# Patient Record
Sex: MALE | Race: WHITE | Employment: FULL TIME | URBAN - METROPOLITAN AREA
[De-identification: names, ages, dates, MRNs, and addresses within clinical notes are randomized per-mention and may not be internally consistent; named-entity substitution may affect disease eponyms.]

---

## 2017-03-20 ENCOUNTER — OFFICE VISIT (OUTPATIENT)
Dept: FAMILY MEDICINE CLINIC | Age: 52
End: 2017-03-20

## 2017-03-20 VITALS
DIASTOLIC BLOOD PRESSURE: 89 MMHG | HEART RATE: 85 BPM | RESPIRATION RATE: 14 BRPM | WEIGHT: 295.6 LBS | BODY MASS INDEX: 42.32 KG/M2 | TEMPERATURE: 98.2 F | SYSTOLIC BLOOD PRESSURE: 133 MMHG | HEIGHT: 70 IN

## 2017-03-20 DIAGNOSIS — E11.9 TYPE 2 DIABETES MELLITUS WITHOUT COMPLICATION, WITHOUT LONG-TERM CURRENT USE OF INSULIN (HCC): Primary | ICD-10-CM

## 2017-03-20 DIAGNOSIS — E55.9 VITAMIN D DEFICIENCY: ICD-10-CM

## 2017-03-20 DIAGNOSIS — E11.9 NEW ONSET TYPE 2 DIABETES MELLITUS (HCC): Primary | ICD-10-CM

## 2017-03-20 DIAGNOSIS — G47.33 OBSTRUCTIVE SLEEP APNEA: ICD-10-CM

## 2017-03-20 DIAGNOSIS — I69.90 LATE EFFECTS OF CVA (CEREBROVASCULAR ACCIDENT): ICD-10-CM

## 2017-03-20 DIAGNOSIS — E66.3 OVERWEIGHT: ICD-10-CM

## 2017-03-20 LAB
ANION GAP SERPL CALCULATED.3IONS-SCNC: 15 MEQ/L (ref 8–16)
BASOPHILS # BLD: 0.2 %
BASOPHILS ABSOLUTE: 0 THOU/MM3 (ref 0–0.1)
BUN BLDV-MCNC: 14 MG/DL (ref 7–22)
CALCIUM SERPL-MCNC: 8.7 MG/DL (ref 8.5–10.5)
CHLORIDE BLD-SCNC: 103 MEQ/L (ref 98–111)
CO2: 24 MEQ/L (ref 23–33)
CREAT SERPL-MCNC: 0.8 MG/DL (ref 0.4–1.2)
CREATININE, URINE: 287.4 MG/DL
EOSINOPHIL # BLD: 1 %
EOSINOPHILS ABSOLUTE: 0.1 THOU/MM3 (ref 0–0.4)
GFR SERPL CREATININE-BSD FRML MDRD: > 90 ML/MIN/1.73M2
GLUCOSE BLD-MCNC: 128 MG/DL (ref 70–108)
HBA1C MFR BLD: 6.6 %
HCT VFR BLD CALC: 43.8 % (ref 42–52)
HEMOGLOBIN: 14.4 GM/DL (ref 14–18)
LYMPHOCYTES # BLD: 22.7 %
LYMPHOCYTES ABSOLUTE: 2.5 THOU/MM3 (ref 1–4.8)
MAGNESIUM: 2.2 MG/DL (ref 1.6–2.4)
MCH RBC QN AUTO: 29.5 PG (ref 27–31)
MCHC RBC AUTO-ENTMCNC: 32.8 GM/DL (ref 33–37)
MCV RBC AUTO: 90.1 FL (ref 80–94)
MICROALBUMIN UR-MCNC: 1.75 MG/DL
MICROALBUMIN/CREAT UR-RTO: 6 MG/G (ref 0–30)
MONOCYTES # BLD: 5.7 %
MONOCYTES ABSOLUTE: 0.6 THOU/MM3 (ref 0.4–1.3)
NUCLEATED RED BLOOD CELLS: 0 /100 WBC
PDW BLD-RTO: 13.2 % (ref 11.5–14.5)
PLATELET # BLD: 317 THOU/MM3 (ref 130–400)
PMV BLD AUTO: 8.9 MCM (ref 7.4–10.4)
POTASSIUM SERPL-SCNC: 4.1 MEQ/L (ref 3.5–5.2)
PTH INTACT: 66.8 PG/ML (ref 15–65)
RBC # BLD: 4.86 MILL/MM3 (ref 4.7–6.1)
RBC # BLD: NORMAL 10*6/UL
SEDIMENTATION RATE, ERYTHROCYTE: 18 MM/HR (ref 0–10)
SEG NEUTROPHILS: 70.4 %
SEGMENTED NEUTROPHILS ABSOLUTE COUNT: 7.7 THOU/MM3 (ref 1.8–7.7)
SODIUM BLD-SCNC: 142 MEQ/L (ref 135–145)
THYROXINE (T4): 6.1 UG/DL (ref 4.5–12)
TSH SERPL DL<=0.05 MIU/L-ACNC: 3.5 MCIU/ML (ref 0.4–4.2)
VITAMIN D 25-HYDROXY: 30 NG/ML (ref 30–100)
WBC # BLD: 11 THOU/MM3 (ref 4.8–10.8)

## 2017-03-20 PROCEDURE — 99214 OFFICE O/P EST MOD 30 MIN: CPT | Performed by: FAMILY MEDICINE

## 2017-03-20 PROCEDURE — 36415 COLL VENOUS BLD VENIPUNCTURE: CPT | Performed by: FAMILY MEDICINE

## 2017-03-20 PROCEDURE — 83036 HEMOGLOBIN GLYCOSYLATED A1C: CPT | Performed by: FAMILY MEDICINE

## 2017-03-20 RX ORDER — ERGOCALCIFEROL (VITAMIN D2) 1250 MCG
50000 CAPSULE ORAL WEEKLY
Qty: 4 CAPSULE | Refills: 5 | Status: SHIPPED | OUTPATIENT
Start: 2017-03-20 | End: 2017-12-27 | Stop reason: DRUGHIGH

## 2017-03-20 RX ORDER — AMPICILLIN TRIHYDRATE 250 MG
1 CAPSULE ORAL
Qty: 90 CAPSULE | Refills: 5 | Status: SHIPPED | OUTPATIENT
Start: 2017-03-20

## 2017-03-20 RX ORDER — M-VIT,TX,IRON,MINS/CALC/FOLIC 27MG-0.4MG
1 TABLET ORAL DAILY
COMMUNITY

## 2017-03-20 RX ORDER — BLOOD-GLUCOSE METER
1 KIT MISCELLANEOUS DAILY
Qty: 1 KIT | Refills: 0 | Status: SHIPPED | OUTPATIENT
Start: 2017-03-20 | End: 2018-12-17 | Stop reason: CLARIF

## 2017-03-20 RX ORDER — SERTRALINE HYDROCHLORIDE 100 MG/1
100 TABLET, FILM COATED ORAL DAILY
Qty: 30 TABLET | Refills: 5 | Status: SHIPPED | OUTPATIENT
Start: 2017-03-20 | End: 2017-06-20 | Stop reason: SDUPTHER

## 2017-03-20 RX ORDER — ATORVASTATIN CALCIUM 20 MG/1
20 TABLET, FILM COATED ORAL DAILY
Qty: 30 TABLET | Refills: 5 | Status: SHIPPED | OUTPATIENT
Start: 2017-03-20 | End: 2017-06-20 | Stop reason: SDUPTHER

## 2017-03-20 ASSESSMENT — ENCOUNTER SYMPTOMS
ABDOMINAL PAIN: 0
SHORTNESS OF BREATH: 0
DIARRHEA: 0
BLOOD IN STOOL: 0
VOMITING: 0
CONSTIPATION: 0
NAUSEA: 0
EYE PAIN: 0
COUGH: 0
TROUBLE SWALLOWING: 0

## 2017-03-21 ENCOUNTER — TELEPHONE (OUTPATIENT)
Dept: FAMILY MEDICINE CLINIC | Age: 52
End: 2017-03-21

## 2017-03-21 DIAGNOSIS — E55.9 VITAMIN D DEFICIENCY: Primary | ICD-10-CM

## 2017-03-21 DIAGNOSIS — E11.9 NEW ONSET TYPE 2 DIABETES MELLITUS (HCC): ICD-10-CM

## 2017-03-21 RX ORDER — LANCETS
EACH MISCELLANEOUS
Qty: 100 EACH | Refills: 3 | Status: SHIPPED | OUTPATIENT
Start: 2017-03-21 | End: 2017-06-20 | Stop reason: SDUPTHER

## 2017-03-24 ENCOUNTER — NURSE ONLY (OUTPATIENT)
Dept: FAMILY MEDICINE CLINIC | Age: 52
End: 2017-03-24

## 2017-03-24 DIAGNOSIS — Z71.89 DIABETES EDUCATION, ENCOUNTER FOR: Primary | ICD-10-CM

## 2017-04-21 ENCOUNTER — OFFICE VISIT (OUTPATIENT)
Dept: FAMILY MEDICINE CLINIC | Age: 52
End: 2017-04-21

## 2017-04-21 VITALS
RESPIRATION RATE: 10 BRPM | HEIGHT: 70 IN | SYSTOLIC BLOOD PRESSURE: 134 MMHG | HEART RATE: 65 BPM | BODY MASS INDEX: 39.4 KG/M2 | DIASTOLIC BLOOD PRESSURE: 77 MMHG | TEMPERATURE: 97.6 F | WEIGHT: 275.2 LBS

## 2017-04-21 DIAGNOSIS — E11.9 NEW ONSET TYPE 2 DIABETES MELLITUS (HCC): ICD-10-CM

## 2017-04-21 DIAGNOSIS — E83.51 HYPOCALCEMIA: Primary | ICD-10-CM

## 2017-04-21 PROCEDURE — 99214 OFFICE O/P EST MOD 30 MIN: CPT | Performed by: NURSE PRACTITIONER

## 2017-04-21 ASSESSMENT — ENCOUNTER SYMPTOMS
COUGH: 0
COLOR CHANGE: 0
SHORTNESS OF BREATH: 0
ABDOMINAL DISTENTION: 0
NAUSEA: 0
ABDOMINAL PAIN: 0
ANAL BLEEDING: 0
BLOOD IN STOOL: 0
DIARRHEA: 0
EYE REDNESS: 0
CONSTIPATION: 0
EYE DISCHARGE: 0
SORE THROAT: 0
RHINORRHEA: 0

## 2017-06-20 ENCOUNTER — OFFICE VISIT (OUTPATIENT)
Dept: FAMILY MEDICINE CLINIC | Age: 52
End: 2017-06-20

## 2017-06-20 VITALS
DIASTOLIC BLOOD PRESSURE: 80 MMHG | OXYGEN SATURATION: 98 % | HEART RATE: 64 BPM | HEIGHT: 71 IN | SYSTOLIC BLOOD PRESSURE: 110 MMHG | RESPIRATION RATE: 16 BRPM | WEIGHT: 249 LBS | TEMPERATURE: 97.9 F | BODY MASS INDEX: 34.86 KG/M2

## 2017-06-20 DIAGNOSIS — E11.9 DIET-CONTROLLED DIABETES MELLITUS (HCC): Primary | ICD-10-CM

## 2017-06-20 DIAGNOSIS — Z23 NEED FOR PROPHYLACTIC VACCINATION AGAINST STREPTOCOCCUS PNEUMONIAE (PNEUMOCOCCUS): ICD-10-CM

## 2017-06-20 DIAGNOSIS — E55.9 VITAMIN D DEFICIENCY: ICD-10-CM

## 2017-06-20 DIAGNOSIS — I69.90 LATE EFFECTS OF CVA (CEREBROVASCULAR ACCIDENT): ICD-10-CM

## 2017-06-20 LAB — HBA1C MFR BLD: 5.8 %

## 2017-06-20 PROCEDURE — 99214 OFFICE O/P EST MOD 30 MIN: CPT | Performed by: FAMILY MEDICINE

## 2017-06-20 PROCEDURE — 83036 HEMOGLOBIN GLYCOSYLATED A1C: CPT | Performed by: FAMILY MEDICINE

## 2017-06-20 RX ORDER — LANCETS
EACH MISCELLANEOUS
Qty: 100 EACH | Refills: 3 | Status: SHIPPED | OUTPATIENT
Start: 2017-06-20 | End: 2017-12-27 | Stop reason: SDUPTHER

## 2017-06-20 RX ORDER — AMPICILLIN TRIHYDRATE 250 MG
1 CAPSULE ORAL
Qty: 90 CAPSULE | Refills: 5 | Status: CANCELLED | OUTPATIENT
Start: 2017-06-20

## 2017-06-20 RX ORDER — ERGOCALCIFEROL (VITAMIN D2) 1250 MCG
50000 CAPSULE ORAL WEEKLY
Qty: 4 CAPSULE | Refills: 5 | Status: CANCELLED | OUTPATIENT
Start: 2017-06-20

## 2017-06-20 RX ORDER — ATORVASTATIN CALCIUM 20 MG/1
20 TABLET, FILM COATED ORAL DAILY
Qty: 30 TABLET | Refills: 5 | Status: SHIPPED | OUTPATIENT
Start: 2017-06-20 | End: 2017-12-27 | Stop reason: SDUPTHER

## 2017-06-20 RX ORDER — SERTRALINE HYDROCHLORIDE 100 MG/1
100 TABLET, FILM COATED ORAL DAILY
Qty: 30 TABLET | Refills: 5 | Status: SHIPPED | OUTPATIENT
Start: 2017-06-20 | End: 2017-12-27 | Stop reason: SDUPTHER

## 2017-06-20 RX ORDER — ACETAMINOPHEN 160 MG
1 TABLET,DISINTEGRATING ORAL DAILY
Qty: 30 CAPSULE | Refills: 5 | Status: SHIPPED | OUTPATIENT
Start: 2017-06-20 | End: 2018-10-23

## 2017-06-20 ASSESSMENT — PATIENT HEALTH QUESTIONNAIRE - PHQ9
SUM OF ALL RESPONSES TO PHQ QUESTIONS 1-9: 0
2. FEELING DOWN, DEPRESSED OR HOPELESS: 0
SUM OF ALL RESPONSES TO PHQ9 QUESTIONS 1 & 2: 0
1. LITTLE INTEREST OR PLEASURE IN DOING THINGS: 0

## 2017-09-27 ENCOUNTER — OFFICE VISIT (OUTPATIENT)
Dept: FAMILY MEDICINE CLINIC | Age: 52
End: 2017-09-27
Payer: COMMERCIAL

## 2017-09-27 VITALS
HEIGHT: 70 IN | TEMPERATURE: 97.4 F | SYSTOLIC BLOOD PRESSURE: 126 MMHG | HEART RATE: 60 BPM | BODY MASS INDEX: 33.56 KG/M2 | WEIGHT: 234.4 LBS | OXYGEN SATURATION: 95 % | DIASTOLIC BLOOD PRESSURE: 78 MMHG

## 2017-09-27 DIAGNOSIS — E11.9 DIET-CONTROLLED DIABETES MELLITUS (HCC): ICD-10-CM

## 2017-09-27 DIAGNOSIS — R05.3 CHRONIC COUGH: ICD-10-CM

## 2017-09-27 DIAGNOSIS — Z23 NEED FOR PROPHYLACTIC VACCINATION AGAINST STREPTOCOCCUS PNEUMONIAE (PNEUMOCOCCUS): ICD-10-CM

## 2017-09-27 DIAGNOSIS — R73.02 GLUCOSE INTOLERANCE (IMPAIRED GLUCOSE TOLERANCE): Primary | ICD-10-CM

## 2017-09-27 DIAGNOSIS — E66.9 OBESITY (BMI 30-39.9): ICD-10-CM

## 2017-09-27 DIAGNOSIS — E55.9 VITAMIN D DEFICIENCY: ICD-10-CM

## 2017-09-27 DIAGNOSIS — Z87.891 HX OF TOBACCO USE, PRESENTING HAZARDS TO HEALTH: ICD-10-CM

## 2017-09-27 LAB
ALBUMIN SERPL-MCNC: 4.4 G/DL (ref 3.5–5.1)
ALP BLD-CCNC: 109 U/L (ref 38–126)
ALT SERPL-CCNC: 33 U/L (ref 11–66)
ANION GAP SERPL CALCULATED.3IONS-SCNC: 12 MEQ/L (ref 8–16)
AST SERPL-CCNC: 19 U/L (ref 5–40)
BILIRUB SERPL-MCNC: 0.3 MG/DL (ref 0.3–1.2)
BUN BLDV-MCNC: 18 MG/DL (ref 7–22)
CALCIUM SERPL-MCNC: 9.2 MG/DL (ref 8.5–10.5)
CHLORIDE BLD-SCNC: 104 MEQ/L (ref 98–111)
CHOLESTEROL, FASTING: 102 MG/DL (ref 100–199)
CO2: 28 MEQ/L (ref 23–33)
CREAT SERPL-MCNC: 0.7 MG/DL (ref 0.4–1.2)
GFR SERPL CREATININE-BSD FRML MDRD: > 90 ML/MIN/1.73M2
GLUCOSE BLD-MCNC: 101 MG/DL (ref 70–108)
HBA1C MFR BLD: 5.6 %
HDLC SERPL-MCNC: 33 MG/DL
LDL CHOLESTEROL CALCULATED: 59 MG/DL
POTASSIUM SERPL-SCNC: 4.3 MEQ/L (ref 3.5–5.2)
SODIUM BLD-SCNC: 144 MEQ/L (ref 135–145)
TOTAL PROTEIN: 7.3 G/DL (ref 6.1–8)
TRIGLYCERIDE, FASTING: 49 MG/DL (ref 0–199)
VITAMIN D 25-HYDROXY: 36 NG/ML (ref 30–100)

## 2017-09-27 PROCEDURE — 83036 HEMOGLOBIN GLYCOSYLATED A1C: CPT | Performed by: NURSE PRACTITIONER

## 2017-09-27 PROCEDURE — 90471 IMMUNIZATION ADMIN: CPT | Performed by: NURSE PRACTITIONER

## 2017-09-27 PROCEDURE — 90688 IIV4 VACCINE SPLT 0.5 ML IM: CPT | Performed by: NURSE PRACTITIONER

## 2017-09-27 PROCEDURE — 36415 COLL VENOUS BLD VENIPUNCTURE: CPT | Performed by: NURSE PRACTITIONER

## 2017-09-27 PROCEDURE — 99214 OFFICE O/P EST MOD 30 MIN: CPT | Performed by: NURSE PRACTITIONER

## 2017-09-27 ASSESSMENT — ENCOUNTER SYMPTOMS
COLOR CHANGE: 0
ABDOMINAL DISTENTION: 0
NAUSEA: 0
ABDOMINAL PAIN: 0
RHINORRHEA: 0
COUGH: 1
BLOOD IN STOOL: 0
SHORTNESS OF BREATH: 0
CONSTIPATION: 0
DIARRHEA: 0
EYE DISCHARGE: 0
EYE REDNESS: 0
ANAL BLEEDING: 0
SORE THROAT: 0

## 2017-09-29 ENCOUNTER — TELEPHONE (OUTPATIENT)
Dept: FAMILY MEDICINE CLINIC | Age: 52
End: 2017-09-29

## 2017-10-03 ENCOUNTER — HOSPITAL ENCOUNTER (OUTPATIENT)
Age: 52
Discharge: HOME | End: 2017-10-03
Payer: COMMERCIAL

## 2017-10-03 ENCOUNTER — HOSPITAL ENCOUNTER (OUTPATIENT)
Dept: GENERAL RADIOLOGY | Age: 52
Discharge: HOME | End: 2017-10-03
Payer: COMMERCIAL

## 2017-10-03 DIAGNOSIS — Z87.891 HX OF TOBACCO USE, PRESENTING HAZARDS TO HEALTH: ICD-10-CM

## 2017-10-03 DIAGNOSIS — R05.3 CHRONIC COUGH: ICD-10-CM

## 2017-10-03 PROCEDURE — 71020 XR CHEST STANDARD TWO VW: CPT

## 2017-10-04 ENCOUNTER — TELEPHONE (OUTPATIENT)
Dept: FAMILY MEDICINE CLINIC | Age: 52
End: 2017-10-04

## 2017-10-04 DIAGNOSIS — R05.9 COUGH IN ADULT PATIENT: Primary | ICD-10-CM

## 2017-10-04 DIAGNOSIS — Z87.891 HX OF TOBACCO USE, PRESENTING HAZARDS TO HEALTH: ICD-10-CM

## 2017-10-04 NOTE — TELEPHONE ENCOUNTER
CALLED AND SPOKE WITH PATIENTS WIFE REGARDING KACI'S NOTE. VERBALIZED UNDERSTANDING. STATES SHE WOULD LIKE TO HAVE HIS PFT DONE  & [facility] ANY DAY AFTER 4PM. AWARE THAT PULMONOLOGIST OFFICE WILL CONTACT THEM REGARDING REFERRAL AS WELL.

## 2017-10-04 NOTE — TELEPHONE ENCOUNTER
----- Message from Rowena France CNP sent at 10/4/2017  2:00 PM EDT -----  Orvan Herrerae is suggestive of chronic lung disease - will order PFT and place pulmonology referral

## 2017-10-05 ENCOUNTER — TELEPHONE (OUTPATIENT)
Dept: FAMILY MEDICINE CLINIC | Age: 52
End: 2017-10-05

## 2017-10-05 NOTE — TELEPHONE ENCOUNTER
That is perfectly fine. Would he like to see a Pulmonologist anyway? I placed a referral yesterday to see Dr. Read [facility]).

## 2017-10-05 NOTE — TELEPHONE ENCOUNTER
Patients wife Conchita Ochoa calling in regarding pulmonary test that the patient is supposed to have (see previous msg). She said due to his health history, the patient does not want to do the test, please call them back to discuss.

## 2017-10-05 NOTE — TELEPHONE ENCOUNTER
Patient returned call. States that he currently works at [employer] and that Dr. Marilyn Hatch the doctor there, states that patient should not due the PFT's due to him having a hole in his heart and the fact that he has had a stroke. Patient is refusing the test because he does not want to have something go wrong with either his brain or heart. Patient wanted me to let Acosta Hebert know this and update him with her thoughts. Please advise.

## 2017-12-27 ENCOUNTER — OFFICE VISIT (OUTPATIENT)
Dept: FAMILY MEDICINE CLINIC | Age: 52
End: 2017-12-27
Payer: COMMERCIAL

## 2017-12-27 VITALS
HEART RATE: 80 BPM | OXYGEN SATURATION: 97 % | WEIGHT: 257 LBS | DIASTOLIC BLOOD PRESSURE: 64 MMHG | SYSTOLIC BLOOD PRESSURE: 118 MMHG | TEMPERATURE: 98.1 F | HEIGHT: 70 IN | BODY MASS INDEX: 36.79 KG/M2

## 2017-12-27 DIAGNOSIS — Z87.74 S/P PATENT FORAMEN OVALE CLOSURE: ICD-10-CM

## 2017-12-27 DIAGNOSIS — Z23 NEED FOR PROPHYLACTIC VACCINATION AGAINST STREPTOCOCCUS PNEUMONIAE (PNEUMOCOCCUS): ICD-10-CM

## 2017-12-27 DIAGNOSIS — I69.90 LATE EFFECTS OF CVA (CEREBROVASCULAR ACCIDENT): ICD-10-CM

## 2017-12-27 DIAGNOSIS — E55.9 VITAMIN D DEFICIENCY: ICD-10-CM

## 2017-12-27 DIAGNOSIS — E11.9 DIET-CONTROLLED DIABETES MELLITUS (HCC): Primary | ICD-10-CM

## 2017-12-27 DIAGNOSIS — E66.9 OBESITY (BMI 30-39.9): ICD-10-CM

## 2017-12-27 LAB — HBA1C MFR BLD: 5.7 %

## 2017-12-27 PROCEDURE — 90732 PPSV23 VACC 2 YRS+ SUBQ/IM: CPT | Performed by: FAMILY MEDICINE

## 2017-12-27 PROCEDURE — G8417 CALC BMI ABV UP PARAM F/U: HCPCS | Performed by: FAMILY MEDICINE

## 2017-12-27 PROCEDURE — 99214 OFFICE O/P EST MOD 30 MIN: CPT | Performed by: FAMILY MEDICINE

## 2017-12-27 PROCEDURE — 90471 IMMUNIZATION ADMIN: CPT | Performed by: FAMILY MEDICINE

## 2017-12-27 PROCEDURE — 3017F COLORECTAL CA SCREEN DOC REV: CPT | Performed by: FAMILY MEDICINE

## 2017-12-27 PROCEDURE — G8598 ASA/ANTIPLAT THER USED: HCPCS | Performed by: FAMILY MEDICINE

## 2017-12-27 PROCEDURE — G8427 DOCREV CUR MEDS BY ELIG CLIN: HCPCS | Performed by: FAMILY MEDICINE

## 2017-12-27 PROCEDURE — 3044F HG A1C LEVEL LT 7.0%: CPT | Performed by: FAMILY MEDICINE

## 2017-12-27 PROCEDURE — 1036F TOBACCO NON-USER: CPT | Performed by: FAMILY MEDICINE

## 2017-12-27 PROCEDURE — G8484 FLU IMMUNIZE NO ADMIN: HCPCS | Performed by: FAMILY MEDICINE

## 2017-12-27 PROCEDURE — 83036 HEMOGLOBIN GLYCOSYLATED A1C: CPT | Performed by: FAMILY MEDICINE

## 2017-12-27 RX ORDER — ACETAMINOPHEN 160 MG
1 TABLET,DISINTEGRATING ORAL DAILY
Qty: 30 CAPSULE | Refills: 5 | Status: CANCELLED | OUTPATIENT
Start: 2017-12-27

## 2017-12-27 RX ORDER — AMPICILLIN TRIHYDRATE 250 MG
1 CAPSULE ORAL
Qty: 90 CAPSULE | Refills: 5 | Status: CANCELLED | OUTPATIENT
Start: 2017-12-27

## 2017-12-27 RX ORDER — ATORVASTATIN CALCIUM 20 MG/1
20 TABLET, FILM COATED ORAL DAILY
Qty: 30 TABLET | Refills: 5 | Status: SHIPPED | OUTPATIENT
Start: 2017-12-27 | End: 2018-10-17 | Stop reason: SDUPTHER

## 2017-12-27 RX ORDER — LANCETS
EACH MISCELLANEOUS
Qty: 100 EACH | Refills: 3 | Status: SHIPPED | OUTPATIENT
Start: 2017-12-27 | End: 2019-10-21 | Stop reason: SDUPTHER

## 2017-12-27 RX ORDER — SERTRALINE HYDROCHLORIDE 100 MG/1
100 TABLET, FILM COATED ORAL DAILY
Qty: 30 TABLET | Refills: 5 | Status: SHIPPED | OUTPATIENT
Start: 2017-12-27 | End: 2018-06-25 | Stop reason: SDUPTHER

## 2017-12-27 NOTE — PATIENT INSTRUCTIONS
You may receive a survey about your visit with us today. The feedback from our patients helps us identify what is working well and where the service to all patients can be enhanced. Thank you! Patient Education        Pneumococcal Polysaccharide Vaccine: What You Need to Know  Why get vaccinated? Vaccination can protect older adults (and some children and younger adults) from pneumococcal disease. Pneumococcal disease is caused by bacteria that can spread from person to person through close contact. It can cause ear infections, and it can also lead to more serious infections of the:  · Lungs (pneumonia),  · Blood (bacteremia), and  · Covering of the brain and spinal cord (meningitis). Meningitis can cause deafness and brain damage, and it can be fatal.  Anyone can get pneumococcal disease, but children under 3years of age, people with certain medical conditions, adults over 72years of age, and cigarette smokers are at the highest risk. About 18,000 older adults die each year from pneumococcal disease in the [place]. Treatment of pneumococcal infections with penicillin and other drugs used to be more effective. But some strains of the disease have become resistant to these drugs. This makes prevention of the disease, through vaccination, even more important. Pneumococcal polysaccharide vaccine (PPSV23)  Pneumococcal polysaccharide vaccine (PPSV23) protects against 23 types of pneumococcal bacteria. It will not prevent all pneumococcal disease. PPSV23 is recommended for:  · All adults 72years of age and older,  · Anyone 2 through 59years of age with certain long-term health problems,  · Anyone 2 through 59years of age with a weakened immune system,  · Adults 23 through 59years of age who smoke cigarettes or have asthma. Most people need only one dose of PPSV. A second dose is recommended for certain high-risk groups.  People 72 and older should get a dose even if they have gotten one or more doses doses, and would happen within a few minutes to a few hours after the vaccination. As with any medicine, there is a very remote chance of a vaccine causing a serious injury or death. The safety of vaccines is always being monitored. For more information, visit: www.cdc.gov/vaccinesafety/  What if there is a serious reaction? What should I look for? Look for anything that concerns you, such as signs of a severe allergic reaction, very high fever, or unusual behavior. Signs of a severe allergic reaction can include hives, swelling of the face and throat, difficulty breathing, a fast heartbeat, dizziness, and weakness. These would usually start a few minutes to a few hours after the vaccination. What should I do? If you think it is a severe allergic reaction or other emergency that can't wait, call 9-1-1 or get to the nearest hospital. Otherwise, call your doctor. Afterward, the reaction should be reported to the Vaccine Adverse Event Reporting System (VAERS). Your doctor might file this report, or you can do it yourself through the VAERS web site at www.vaers. [facility].gov, or by calling 3-168.872.3302. VAERS does not give medical advice. How can I learn more? · Ask your doctor. He or she can give you the vaccine package insert or suggest other sources of information. · Call your local or state health department. · Contact the Centers for Disease Control and Prevention (CDC):  ¨ Call 1-924.372.7610 (1-800-CDC-INFO) or  ¨ Visit CDC's website at www.cdc.gov/vaccines  Vaccine Information Statement  PPSV Vaccine  (04/24/2015)  Department of Health and Human Services  Centers for Disease Control and Prevention  Many Vaccine Information Statements are available in Serbian and other languages. See www.immunize.org/vis. Hojas de información Sobre Vacunas están disponibles en español y en muchos otros idiomas. Visite Emmanuelle.si. Care instructions adapted under license by [facility] ([facility]).  If you have questions about a medical condition or this instruction, always ask your healthcare professional. [place] [address] any warranty or liability for your use of this information.

## 2017-12-27 NOTE — PROGRESS NOTES
Progress Note    Patient Name:  Swathi Laureano   : 1965   Age: 50 y.o. Date of Exam:  2017       Reason For Visit:   Chief Complaint   Patient presents with    3 Month Follow-Up    Diabetes     17 5.6%        Kris Prater is a 50 y.o. male who comes today to the office for follow up of diabetes. Diabetes:  Diagnosed in 2017. He has been prediabetic for few years and during his blood test in March he was found to have a HbA1C of 6.4 while he was admitted to the hospital.  Diabetes team was consulted and he was started on Metformin 500 mg PO BID. He wanted to loose weight on his own and become a non diabetic and he did it! .  He lost 46 pound and his HbA1C dropped to 5.8. He has gained 23 pounds since then. HbA1C today was 5.7%. He is not taking any medications. He has changed his diet mainly to low  sugar and much smaller portions, but since the holidays has been here he has been eating more. He has history of right cerebellar stroke in 2014. 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 He was admitted to the hospital for syncope episode and his condition worsened after admission. He was found to have a fourth ventricle effacement, mass effect, cerebellar tonsil inferior displacement. He was taken for suboccipital craniectomy (3/13/14) with infarcted tissue resection. Interval improvement with some intermittent dizziness afterwards. During his workup, evaluation and treatment at the [facility] he was noted to have a patent foramen ovale with a small septal aneurysm and significant right to left shunting. He had a remarkable recovery with total return of neurologic motion following his brain surgery and physical therapy with mild residual short-term memory and cognitive thinking but was able to return to work after he had his PFO closed.   He underwent successful closure of the PFO using ICE-guided placement of a  25 mm Amplatzer occluder device on 11/17/14 . He could not be on Coumadin due to his past CNS issues. Hypercoagulable workup was negative. Since then he has been on   mg and Lipitor 20 mg q day. At hat time his HgbA1C was 6.4 so the diabetes team was consulted for further recommentions. It was felt he would most benefit from lifestyle modification and he was started on Metformin 500 mg BID which he took for several months, but then stopped and decided to do it just with diet. That is when he lost the 46 pounds. Vitamin D insufficiency:  Last vitamin D was 30 in March 2017 and he is taking 2000 IU PO daily      Significant Past Medical History:    Past Medical History:   Diagnosis Date    Diabetes mellitus ([address]     Major depression 12/8/2014    Obesity (BMI 30-39.9) 12/8/2014    PFO with atrial septal aneurysm 2014    [facility] in November 2014    Stroke [facility]) 3/10/14    Unspecified cerebral artery occlusion with cerebral infarction            Allergies:  is allergic to shellfish-derived products.     Medications:   Current Outpatient Prescriptions   Medication Sig Dispense Refill    ACCU-CHEK MULTICLIX LANCETS MISC Use as directed 100 each 3    atorvastatin (LIPITOR) 20 MG tablet Take 1 tablet by mouth daily 30 tablet 5    sertraline (ZOLOFT) 100 MG tablet Take 1 tablet by mouth daily 30 tablet 5    Cholecalciferol (VITAMIN D3) 2000 units CAPS Take 1 capsule by mouth daily 30 capsule 5    calcium carbonate-vitamin D3 (CALCIUM 600/VITAMIN D) 600-400 MG-UNIT TABS Take 1 tablet by mouth 2 times daily (Patient taking differently: Take 1 tablet by mouth daily ) 60 tablet 5    Multiple Vitamins-Minerals (THERAPEUTIC MULTIVITAMIN-MINERALS) tablet Take 1 tablet by mouth daily      Cinnamon 500 MG CAPS Take 1 capsule by mouth 3 times daily (with meals) 90 capsule 5    aspirin 325 MG tablet Take 325 mg by mouth daily      glucose blood VI test strips (ASCENSIA AUTODISC VI;ONE TOUCH ULTRA TEST nondistended, no masses or organomegaly  Extremities - peripheral pulses normal, no pedal edema, no clubbing or cyanosis    Impression:  1. Diet-controlled diabetes mellitus (HCC)  POCT glycosylated hemoglobin (Hb A1C)   2. Vitamin D deficiency     3. Late effects of CVA (cerebrovascular accident)  atorvastatin (LIPITOR) 20 MG tablet   4. Obesity (BMI 30-39.9)     5. Need for prophylactic vaccination against Streptococcus pneumoniae (pneumococcus)     6. S/P patent foramen ovale closure         Plan:  Orders Placed This Encounter   Procedures    Pneumococcal polysaccharide vaccine 23-valent >= 1yo subcutaneous/IM (PNEUMOVAX 23)    POCT glycosylated hemoglobin (Hb A1C)     Orders Placed This Encounter   Medications    ACCU-CHEK MULTICLIX LANCETS MISC     Sig: Use as directed     Dispense:  100 each     Refill:  3    atorvastatin (LIPITOR) 20 MG tablet     Sig: Take 1 tablet by mouth daily     Dispense:  30 tablet     Refill:  5    sertraline (ZOLOFT) 100 MG tablet     Sig: Take 1 tablet by mouth daily     Dispense:  30 tablet     Refill:  5       Follow Up:  Return for wellness.     Italo Daniel MD

## 2018-06-26 RX ORDER — SERTRALINE HYDROCHLORIDE 100 MG/1
100 TABLET, FILM COATED ORAL DAILY
Qty: 30 TABLET | Refills: 1 | Status: SHIPPED | OUTPATIENT
Start: 2018-06-26 | End: 2018-09-04 | Stop reason: SDUPTHER

## 2018-08-29 RX ORDER — SERTRALINE HYDROCHLORIDE 100 MG/1
100 TABLET, FILM COATED ORAL DAILY
Qty: 30 TABLET | Refills: 1 | OUTPATIENT
Start: 2018-08-29

## 2018-08-29 NOTE — TELEPHONE ENCOUNTER
08/29/2018   China Cardona called requesting a refill on the following medications:  Requested Prescriptions     Pending Prescriptions Disp Refills    sertraline (ZOLOFT) 100 MG tablet 30 tablet 1     Sig: Take 1 tablet by mouth daily     Pharmacy verified:  [email or web address]      Date of last visit: 12/27/2017  Date of next visit (if applicable): Visit date not found

## 2018-09-04 RX ORDER — SERTRALINE HYDROCHLORIDE 100 MG/1
100 TABLET, FILM COATED ORAL DAILY
Qty: 30 TABLET | Refills: 0 | Status: SHIPPED | OUTPATIENT
Start: 2018-09-04 | End: 2018-10-03 | Stop reason: SDUPTHER

## 2018-10-03 RX ORDER — SERTRALINE HYDROCHLORIDE 100 MG/1
100 TABLET, FILM COATED ORAL DAILY
Qty: 30 TABLET | Refills: 0 | Status: SHIPPED | OUTPATIENT
Start: 2018-10-03 | End: 2018-10-17 | Stop reason: SDUPTHER

## 2018-10-03 NOTE — TELEPHONE ENCOUNTER
Stephan Muñoz called requesting a refill on the following medications:  Requested Prescriptions     Pending Prescriptions Disp Refills    sertraline (ZOLOFT) 100 MG tablet 30 tablet 0     Sig: Take 1 tablet by mouth daily     Pharmacy verified:  [email or web address]      Date of last visit: 12/27/17  Date of next visit (if applicable): [id number]

## 2018-10-17 ENCOUNTER — OFFICE VISIT (OUTPATIENT)
Dept: FAMILY MEDICINE CLINIC | Age: 53
End: 2018-10-17
Payer: COMMERCIAL

## 2018-10-17 VITALS
HEART RATE: 74 BPM | SYSTOLIC BLOOD PRESSURE: 132 MMHG | DIASTOLIC BLOOD PRESSURE: 87 MMHG | BODY MASS INDEX: 40.32 KG/M2 | TEMPERATURE: 98.1 F | WEIGHT: 288 LBS | HEIGHT: 71 IN

## 2018-10-17 DIAGNOSIS — Z11.4 SCREENING FOR HIV WITHOUT PRESENCE OF RISK FACTORS: ICD-10-CM

## 2018-10-17 DIAGNOSIS — Z13.220 SCREENING FOR HYPERLIPIDEMIA: ICD-10-CM

## 2018-10-17 DIAGNOSIS — E55.9 VITAMIN D DEFICIENCY: ICD-10-CM

## 2018-10-17 DIAGNOSIS — Z87.74 S/P PATENT FORAMEN OVALE CLOSURE: ICD-10-CM

## 2018-10-17 DIAGNOSIS — R45.4 DIFFICULTY CONTROLLING ANGER: ICD-10-CM

## 2018-10-17 DIAGNOSIS — I69.90 LATE EFFECTS OF CVA (CEREBROVASCULAR ACCIDENT): ICD-10-CM

## 2018-10-17 DIAGNOSIS — E11.9 DIET-CONTROLLED DIABETES MELLITUS (HCC): Primary | ICD-10-CM

## 2018-10-17 LAB
ALBUMIN SERPL-MCNC: 4.1 G/DL (ref 3.5–5.1)
ALP BLD-CCNC: 99 U/L (ref 38–126)
ALT SERPL-CCNC: 24 U/L (ref 11–66)
ANION GAP SERPL CALCULATED.3IONS-SCNC: 13 MEQ/L (ref 8–16)
AST SERPL-CCNC: 20 U/L (ref 5–40)
BASOPHILS # BLD: 0.1 %
BASOPHILS ABSOLUTE: 0 THOU/MM3 (ref 0–0.1)
BILIRUB SERPL-MCNC: 0.4 MG/DL (ref 0.3–1.2)
BUN BLDV-MCNC: 10 MG/DL (ref 7–22)
CALCIUM SERPL-MCNC: 9 MG/DL (ref 8.5–10.5)
CHLORIDE BLD-SCNC: 102 MEQ/L (ref 98–111)
CHOLESTEROL, TOTAL: 128 MG/DL (ref 100–199)
CO2: 26 MEQ/L (ref 23–33)
CREAT SERPL-MCNC: 0.7 MG/DL (ref 0.4–1.2)
EOSINOPHIL # BLD: 1.2 %
EOSINOPHILS ABSOLUTE: 0.1 THOU/MM3 (ref 0–0.4)
ERYTHROCYTE [DISTWIDTH] IN BLOOD BY AUTOMATED COUNT: 12.6 % (ref 11.5–14.5)
ERYTHROCYTE [DISTWIDTH] IN BLOOD BY AUTOMATED COUNT: 43.8 FL (ref 35–45)
GFR SERPL CREATININE-BSD FRML MDRD: > 90 ML/MIN/1.73M2
GLUCOSE BLD-MCNC: 110 MG/DL (ref 70–108)
HBA1C MFR BLD: 6.2 %
HCT VFR BLD CALC: 43.3 % (ref 42–52)
HDLC SERPL-MCNC: 32 MG/DL
HEMOGLOBIN: 13.9 GM/DL (ref 14–18)
IMMATURE GRANS (ABS): 0.04 THOU/MM3 (ref 0–0.07)
IMMATURE GRANULOCYTES: 0.4 %
LDL CHOLESTEROL CALCULATED: 74 MG/DL
LYMPHOCYTES # BLD: 20 %
LYMPHOCYTES ABSOLUTE: 1.9 THOU/MM3 (ref 1–4.8)
MCH RBC QN AUTO: 30.3 PG (ref 26–33)
MCHC RBC AUTO-ENTMCNC: 32.1 GM/DL (ref 32.2–35.5)
MCV RBC AUTO: 94.5 FL (ref 80–94)
MONOCYTES # BLD: 6.1 %
MONOCYTES ABSOLUTE: 0.6 THOU/MM3 (ref 0.4–1.3)
NUCLEATED RED BLOOD CELLS: 0 /100 WBC
PLATELET # BLD: 268 THOU/MM3 (ref 130–400)
PMV BLD AUTO: 11.4 FL (ref 9.4–12.4)
POTASSIUM SERPL-SCNC: 4.1 MEQ/L (ref 3.5–5.2)
RBC # BLD: 4.58 MILL/MM3 (ref 4.7–6.1)
SEG NEUTROPHILS: 72.2 %
SEGMENTED NEUTROPHILS ABSOLUTE COUNT: 6.9 THOU/MM3 (ref 1.8–7.7)
SODIUM BLD-SCNC: 141 MEQ/L (ref 135–145)
TOTAL PROTEIN: 7.4 G/DL (ref 6.1–8)
TRIGL SERPL-MCNC: 110 MG/DL (ref 0–199)
TSH SERPL DL<=0.05 MIU/L-ACNC: 2.53 UIU/ML (ref 0.4–4.2)
VITAMIN D 25-HYDROXY: 40 NG/ML (ref 30–100)
WBC # BLD: 9.5 THOU/MM3 (ref 4.8–10.8)

## 2018-10-17 PROCEDURE — 3017F COLORECTAL CA SCREEN DOC REV: CPT | Performed by: FAMILY MEDICINE

## 2018-10-17 PROCEDURE — 3044F HG A1C LEVEL LT 7.0%: CPT | Performed by: FAMILY MEDICINE

## 2018-10-17 PROCEDURE — 2022F DILAT RTA XM EVC RTNOPTHY: CPT | Performed by: FAMILY MEDICINE

## 2018-10-17 PROCEDURE — 83036 HEMOGLOBIN GLYCOSYLATED A1C: CPT | Performed by: FAMILY MEDICINE

## 2018-10-17 PROCEDURE — 99215 OFFICE O/P EST HI 40 MIN: CPT | Performed by: FAMILY MEDICINE

## 2018-10-17 PROCEDURE — 90688 IIV4 VACCINE SPLT 0.5 ML IM: CPT | Performed by: FAMILY MEDICINE

## 2018-10-17 PROCEDURE — 90471 IMMUNIZATION ADMIN: CPT | Performed by: FAMILY MEDICINE

## 2018-10-17 PROCEDURE — G8598 ASA/ANTIPLAT THER USED: HCPCS | Performed by: FAMILY MEDICINE

## 2018-10-17 PROCEDURE — 1036F TOBACCO NON-USER: CPT | Performed by: FAMILY MEDICINE

## 2018-10-17 PROCEDURE — G8427 DOCREV CUR MEDS BY ELIG CLIN: HCPCS | Performed by: FAMILY MEDICINE

## 2018-10-17 PROCEDURE — G8417 CALC BMI ABV UP PARAM F/U: HCPCS | Performed by: FAMILY MEDICINE

## 2018-10-17 PROCEDURE — G8482 FLU IMMUNIZE ORDER/ADMIN: HCPCS | Performed by: FAMILY MEDICINE

## 2018-10-17 RX ORDER — SERTRALINE HYDROCHLORIDE 100 MG/1
100 TABLET, FILM COATED ORAL DAILY
Qty: 30 TABLET | Refills: 0 | Status: SHIPPED | OUTPATIENT
Start: 2018-10-17 | End: 2018-11-05 | Stop reason: SDUPTHER

## 2018-10-17 RX ORDER — ATORVASTATIN CALCIUM 20 MG/1
20 TABLET, FILM COATED ORAL DAILY
Qty: 30 TABLET | Refills: 5 | Status: SHIPPED | OUTPATIENT
Start: 2018-10-17 | End: 2019-01-07 | Stop reason: SDUPTHER

## 2018-10-17 ASSESSMENT — PATIENT HEALTH QUESTIONNAIRE - PHQ9
2. FEELING DOWN, DEPRESSED OR HOPELESS: 0
1. LITTLE INTEREST OR PLEASURE IN DOING THINGS: 0
SUM OF ALL RESPONSES TO PHQ QUESTIONS 1-9: 0
SUM OF ALL RESPONSES TO PHQ QUESTIONS 1-9: 0
SUM OF ALL RESPONSES TO PHQ9 QUESTIONS 1 & 2: 0

## 2018-10-18 LAB — VITAMIN B-12: 578 PG/ML (ref 211–911)

## 2018-10-19 LAB — HIV-2 AB: NEGATIVE

## 2018-10-22 ENCOUNTER — TELEPHONE (OUTPATIENT)
Dept: FAMILY MEDICINE CLINIC | Age: 53
End: 2018-10-22

## 2018-10-22 RX ORDER — CHOLECALCIFEROL (VITAMIN D3) 1250 MCG
50000 CAPSULE ORAL WEEKLY
Qty: 12 CAPSULE | Refills: 1 | Status: CANCELLED | OUTPATIENT
Start: 2018-10-22

## 2018-10-23 RX ORDER — MULTIVIT-MIN/IRON/FOLIC ACID/K 18-600-40
1 CAPSULE ORAL DAILY
Qty: 90 CAPSULE | Refills: 1 | Status: SHIPPED | OUTPATIENT
Start: 2018-10-23 | End: 2019-10-21 | Stop reason: SDUPTHER

## 2018-11-06 RX ORDER — SERTRALINE HYDROCHLORIDE 100 MG/1
100 TABLET, FILM COATED ORAL DAILY
Qty: 30 TABLET | Refills: 5 | Status: SHIPPED | OUTPATIENT
Start: 2018-11-06 | End: 2019-04-17 | Stop reason: SDUPTHER

## 2018-12-17 ENCOUNTER — OFFICE VISIT (OUTPATIENT)
Dept: FAMILY MEDICINE CLINIC | Age: 53
End: 2018-12-17
Payer: COMMERCIAL

## 2018-12-17 VITALS
SYSTOLIC BLOOD PRESSURE: 139 MMHG | DIASTOLIC BLOOD PRESSURE: 91 MMHG | HEIGHT: 71 IN | TEMPERATURE: 98 F | OXYGEN SATURATION: 97 % | WEIGHT: 281 LBS | HEART RATE: 64 BPM | BODY MASS INDEX: 39.34 KG/M2

## 2018-12-17 DIAGNOSIS — D64.9 ANEMIA, UNSPECIFIED TYPE: ICD-10-CM

## 2018-12-17 DIAGNOSIS — E11.9 TYPE 2 DIABETES MELLITUS WITHOUT COMPLICATION, WITHOUT LONG-TERM CURRENT USE OF INSULIN (HCC): ICD-10-CM

## 2018-12-17 DIAGNOSIS — Z23 NEED FOR PROPHYLACTIC VACCINATION AND INOCULATION AGAINST VARICELLA: ICD-10-CM

## 2018-12-17 DIAGNOSIS — Z00.00 ENCOUNTER FOR WELLNESS EXAMINATION IN ADULT: Primary | ICD-10-CM

## 2018-12-17 DIAGNOSIS — Z12.11 COLON CANCER SCREENING: ICD-10-CM

## 2018-12-17 DIAGNOSIS — Z12.5 PROSTATE CANCER SCREENING: ICD-10-CM

## 2018-12-17 LAB
BACTERIA: ABNORMAL
BASOPHILS # BLD: 0.2 %
BASOPHILS ABSOLUTE: 0 THOU/MM3 (ref 0–0.1)
BILIRUBIN URINE: NEGATIVE
BLOOD, URINE: NEGATIVE
CASTS: ABNORMAL /LPF
CASTS: ABNORMAL /LPF
CHARACTER, URINE: CLEAR
COLOR: YELLOW
CRYSTALS: ABNORMAL
EOSINOPHIL # BLD: 1.4 %
EOSINOPHILS ABSOLUTE: 0.2 THOU/MM3 (ref 0–0.4)
EPITHELIAL CELLS, UA: ABNORMAL /HPF
ERYTHROCYTE [DISTWIDTH] IN BLOOD BY AUTOMATED COUNT: 12.7 % (ref 11.5–14.5)
ERYTHROCYTE [DISTWIDTH] IN BLOOD BY AUTOMATED COUNT: 42.5 FL (ref 35–45)
FERRITIN: 201 NG/ML (ref 22–322)
GLUCOSE, URINE: NEGATIVE MG/DL
HCT VFR BLD CALC: 44.6 % (ref 42–52)
HEMOGLOBIN: 14.4 GM/DL (ref 14–18)
IMMATURE GRANS (ABS): 0.06 THOU/MM3 (ref 0–0.07)
IMMATURE GRANULOCYTES: 0.5 %
IRON: 61 UG/DL (ref 65–195)
KETONES, URINE: NEGATIVE
LEUKOCYTE ESTERASE, URINE: ABNORMAL
LYMPHOCYTES # BLD: 26.4 %
LYMPHOCYTES ABSOLUTE: 2.9 THOU/MM3 (ref 1–4.8)
MCH RBC QN AUTO: 29.6 PG (ref 26–33)
MCHC RBC AUTO-ENTMCNC: 32.3 GM/DL (ref 32.2–35.5)
MCV RBC AUTO: 91.8 FL (ref 80–94)
MISCELLANEOUS LAB TEST RESULT: ABNORMAL
MONOCYTES # BLD: 6.8 %
MONOCYTES ABSOLUTE: 0.7 THOU/MM3 (ref 0.4–1.3)
NITRITE, URINE: NEGATIVE
NUCLEATED RED BLOOD CELLS: 0 /100 WBC
PH UA: 6
PLATELET # BLD: 292 THOU/MM3 (ref 130–400)
PMV BLD AUTO: 11.4 FL (ref 9.4–12.4)
PROSTATE SPECIFIC ANTIGEN: 0.34 NG/ML (ref 0–1)
PROTEIN UA: NEGATIVE MG/DL
RBC # BLD: 4.86 MILL/MM3 (ref 4.7–6.1)
RBC URINE: ABNORMAL /HPF
RENAL EPITHELIAL, UA: ABNORMAL
SEG NEUTROPHILS: 64.7 %
SEGMENTED NEUTROPHILS ABSOLUTE COUNT: 7.1 THOU/MM3 (ref 1.8–7.7)
SPECIFIC GRAVITY UA: 1.02 (ref 1–1.03)
UROBILINOGEN, URINE: 0.2 EU/DL
WBC # BLD: 11 THOU/MM3 (ref 4.8–10.8)
WBC UA: ABNORMAL /HPF
YEAST: ABNORMAL

## 2018-12-17 PROCEDURE — 3017F COLORECTAL CA SCREEN DOC REV: CPT | Performed by: NURSE PRACTITIONER

## 2018-12-17 PROCEDURE — 36415 COLL VENOUS BLD VENIPUNCTURE: CPT | Performed by: NURSE PRACTITIONER

## 2018-12-17 PROCEDURE — G8482 FLU IMMUNIZE ORDER/ADMIN: HCPCS | Performed by: NURSE PRACTITIONER

## 2018-12-17 PROCEDURE — G8417 CALC BMI ABV UP PARAM F/U: HCPCS | Performed by: NURSE PRACTITIONER

## 2018-12-17 PROCEDURE — 99214 OFFICE O/P EST MOD 30 MIN: CPT | Performed by: NURSE PRACTITIONER

## 2018-12-17 PROCEDURE — G8427 DOCREV CUR MEDS BY ELIG CLIN: HCPCS | Performed by: NURSE PRACTITIONER

## 2018-12-17 PROCEDURE — 2022F DILAT RTA XM EVC RTNOPTHY: CPT | Performed by: NURSE PRACTITIONER

## 2018-12-17 PROCEDURE — G8598 ASA/ANTIPLAT THER USED: HCPCS | Performed by: NURSE PRACTITIONER

## 2018-12-17 PROCEDURE — 1036F TOBACCO NON-USER: CPT | Performed by: NURSE PRACTITIONER

## 2018-12-17 PROCEDURE — 3044F HG A1C LEVEL LT 7.0%: CPT | Performed by: NURSE PRACTITIONER

## 2018-12-17 RX ORDER — MULTIVIT-MIN/IRON/FOLIC ACID/K 18-600-40
1 CAPSULE ORAL DAILY
Qty: 90 CAPSULE | Refills: 1 | Status: CANCELLED | OUTPATIENT
Start: 2018-12-17

## 2018-12-17 RX ORDER — ATORVASTATIN CALCIUM 20 MG/1
20 TABLET, FILM COATED ORAL DAILY
Qty: 90 TABLET | Refills: 1 | Status: CANCELLED | OUTPATIENT
Start: 2018-12-17

## 2018-12-17 RX ORDER — SERTRALINE HYDROCHLORIDE 100 MG/1
100 TABLET, FILM COATED ORAL DAILY
Qty: 90 TABLET | Refills: 1 | Status: CANCELLED | OUTPATIENT
Start: 2018-12-17

## 2018-12-17 RX ORDER — LANCETS
EACH MISCELLANEOUS
Qty: 100 EACH | Refills: 3 | Status: CANCELLED | OUTPATIENT
Start: 2018-12-17

## 2018-12-17 RX ORDER — AMPICILLIN TRIHYDRATE 250 MG
1 CAPSULE ORAL
Qty: 270 CAPSULE | Refills: 1 | Status: CANCELLED | OUTPATIENT
Start: 2018-12-17

## 2018-12-17 ASSESSMENT — ENCOUNTER SYMPTOMS
ANAL BLEEDING: 0
EYE REDNESS: 0
EYE PAIN: 0
BLOOD IN STOOL: 0
SINUS PRESSURE: 0
NAUSEA: 0
STRIDOR: 0
APNEA: 0
SORE THROAT: 0
BACK PAIN: 0
SHORTNESS OF BREATH: 0
COUGH: 0
WHEEZING: 0
EYE ITCHING: 0
EYE DISCHARGE: 0
TROUBLE SWALLOWING: 0
FACIAL SWELLING: 0
DIARRHEA: 0
CHOKING: 0
ABDOMINAL DISTENTION: 0
ABDOMINAL PAIN: 0
PHOTOPHOBIA: 0
CONSTIPATION: 0
CHEST TIGHTNESS: 0
COLOR CHANGE: 0
RHINORRHEA: 0
VOICE CHANGE: 0
VOMITING: 0
RECTAL PAIN: 0

## 2018-12-17 NOTE — PATIENT INSTRUCTIONS
4 low-dose aspirin. Wait for an ambulance. Do not try to drive yourself.     · You passed out (lost consciousness).    Call your doctor now or seek immediate medical care if:    · You have new or increased shortness of breath.     · You are dizzy or lightheaded, or you feel like you may faint.     · Your fatigue and weakness continue or get worse.     · You have any abnormal bleeding, such as:  ? Nosebleeds. ? Vaginal bleeding that is different (heavier, more frequent, at a different time of the month) than what you are used to.  ? Bloody or black stools, or rectal bleeding. ? Bloody or pink urine.    Watch closely for changes in your health, and be sure to contact your doctor if:    · You do not get better as expected. Where can you learn more? Go to https://uKnow.compeBe Sporteb.[employer]. org and sign in to your [employer] account. Enter R301 in the [employer] Plan box to learn more about \"Anemia: Care Instructions. \"     If you do not have an account, please click on the \"Sign Up Now\" link. Current as of: May 7, 2018  Content Version: 11.8  © 9166-0244 [employer]. Care instructions adapted under license by [facility] ([facility]). If you have questions about a medical condition or this instruction, always ask your healthcare professional. [address] any warranty or liability for your use of this information. Patient Education        Testicular Self-Exam: Care Instructions  Your Care Instructions    A self-exam is a way for you to check for cancer of the testicles. Although testicular cancer is rare, it is one of the most common tumors in men younger than 28. Many testicular cancers are found during self-exam. In the early stages of testicular cancer, the lump, which may be about the size of a pea, usually is not painful. Testicular cancer, especially if treated early, is very often cured.   By doing this self-exam regularly, you can learn the normal size, shape, and weight of your testicles. This allows you to note any changes. Follow-up care is a key part of your treatment and safety. Be sure to make and go to all appointments, and call your doctor if you are having problems. It's also a good idea to know your test results and keep a list of the medicines you take. How can you care for yourself at home? · The exam is best done during or after a bath or shower--when the scrotum, the skin sac that holds the testicles, is relaxed. · Stand and place your right leg on a raised surface about chair height. Then gently feel your scrotum until you find the right testicle. · Roll the testicle gently but firmly between your thumb and fingers of both hands. Carefully feel the surface for lumps. Feel for any change in the size, shape, or texture of the testicle. The testicle should feel round and smooth. It is normal for one testicle to be slightly larger than the other one. · Repeat this for the left side. Feel the entire surface of both testicles. · You may feel the epididymis, the soft tube behind each testicle. Become familiar with this structure so that you won't mistake it for a lump. When should you call for help? Call your doctor now or seek immediate medical care if:    · You have pain in a testicle.    Watch closely for changes in your health, and be sure to contact your doctor if:    · You notice a change in a testicle.     · You notice a lump in a testicle. Where can you learn more? Go to https://Talentory.compeOne-Song.PubNative. org and sign in to your [email or web address] account. Enter H247 in the embraase box to learn more about \"Testicular Self-Exam: Care Instructions. \"     If you do not have an account, please click on the \"Sign Up Now\" link. Current as of: December 3, 2017  Content Version: 11.8  © 1996-4060 Healthwise[employer] Inc[employer]. Care instructions adapted under license by [facility] ([facility]).  If you have questions about a medical condition or this instruction, how and when to exercise. You may need to have a medical exam and tests before you begin. Some types of exercise can be harmful if your diabetes is causing other problems, such as problems with your feet. Your doctor can tell you what types of exercise are good choices for you. These tips can help you exercise safely when you have diabetes. If your diabetes is controlled by diet or medicine that doesn't lower your blood sugar, you don't need to eat a snack before you exercise. · Check your blood sugar before you exercise. And be careful about what you eat. ? If your blood sugar is less than 100, eat a carbohydrate snack before you exercise. ? Be careful when you exercise if your blood sugar is over 300. High blood sugar can make you dehydrated. And that makes your blood sugar levels go even higher. If you have ketones in your blood or urine and your blood sugar is over 300, do not exercise. · Don't try to do too much at first. Build up your exercise program bit by bit. Try to get at least 30 minutes of exercise on most days of the week. Walking is a good choice. You also may want to do other activities, such as riding a bike or swimming. You might try running or gardening. Try to include muscle-strengthening exercises at least 2 times a week. These exercises include push-ups and weight training. You can also use rubber tubing or stretch bands. You stretch or pull the tubing or band to build muscle strength. If you want to exercise more, slowly increase how hard or long you exercise. · You may get symptoms of low blood sugar during exercise or up to 24 hours later. Some symptoms of low blood sugar, such as sweating, a fast heartbeat, or feeling tired, can be confused with what can happen anytime you exercise. Other symptoms may include feeling anxious, dizzy, weak, or shaky. So it's a good idea to check your blood sugar again.   · You can treat low blood sugar by eating or drinking something that has 15 grams of work with a dietitian or a certified diabetes educator (CDE) to help you plan meals and snacks. A dietitian or CDE can also help you lose weight if that is one of your goals. What should you know about eating carbs? Managing the amount of carbohydrate (carbs) you eat is an important part of healthy meals when you have diabetes. Carbohydrate is found in many foods. · Learn which foods have carbs. And learn the amounts of carbs in different foods. ? Bread, cereal, pasta, and rice have about 15 grams of carbs in a serving. A serving is 1 slice of bread (1 ounce), ½ cup of cooked cereal, or 1/3 cup of cooked pasta or rice. ? Fruits have 15 grams of carbs in a serving. A serving is 1 small fresh fruit, such as an apple or orange; ½ of a banana; ½ cup of cooked or canned fruit; ½ cup of fruit juice; 1 cup of melon or raspberries; or 2 tablespoons of dried fruit. ? Milk and no-sugar-added yogurt have 15 grams of carbs in a serving. A serving is 1 cup of milk or 2/3 cup of no-sugar-added yogurt. ? Starchy vegetables have 15 grams of carbs in a serving. A serving is ½ cup of mashed potatoes or sweet potato; 1 cup winter squash; ½ of a small baked potato; ½ cup of cooked beans; or ½ cup cooked corn or green peas. · Learn how much carbs to eat each day and at each meal. A dietitian or CDE can teach you how to keep track of the amount of carbs you eat. This is called carbohydrate counting. · If you are not sure how to count carbohydrate grams, use the Plate Method to plan meals. It is a good, quick way to make sure that you have a balanced meal. It also helps you spread carbs throughout the day. ? Divide your plate by types of foods. Put non-starchy vegetables on half the plate, meat or other protein food on one-quarter of the plate, and a grain or starchy vegetable in the final quarter of the plate.  To this you can add a small piece of fruit and 1 cup of milk or yogurt, depending on how many carbs you are supposed to have plenty of space around the toes. This helps prevent bunions and blisters. ? Try on shoes while wearing the kind of socks you will usually wear with the shoes. ? Avoid plastic shoes. They may rub your feet and cause blisters. Good shoes should be made of materials that are flexible and breathable, such as leather or cloth. ? Break in new shoes slowly by wearing them for no more than an hour a day for several days. Take extra time to check your feet for red areas, blisters, or other problems after you wear new shoes. · Do not go barefoot. Do not wear sandals, and do not wear shoes with very thin soles. Thin soles are easy to puncture. They also do not protect your feet from hot pavement or cold weather. · Have your doctor check your feet during each visit. If you have a foot problem, see your doctor. Do not try to treat an early foot problem at home. Home remedies or treatments that you can buy without a prescription (such as corn removers) can be harmful. · Always get early treatment for foot problems. A minor irritation can lead to a major problem if not properly cared for early. When should you call for help? Call your doctor now or seek immediate medical care if:    · You have a foot sore, an ulcer or break in the skin that is not healing after 4 days, bleeding corns or calluses, or an ingrown toenail.     · You have blue or black areas, which can mean bruising or blood flow problems.     · You have peeling skin or tiny blisters between your toes or cracking or oozing of the skin.     · You have a fever for more than 24 hours and a foot sore.     · You have new numbness or tingling in your feet that does not go away after you move your feet or change positions.     · You have unexplained or unusual swelling of the foot or ankle.    Watch closely for changes in your health, and be sure to contact your doctor if:    · You cannot do proper foot care. Where can you learn more?   Go to

## 2018-12-17 NOTE — PROGRESS NOTES
difficulty, weakness, light-headedness, numbness and headaches. Hematological: Negative for adenopathy. Does not bruise/bleed easily. Psychiatric/Behavioral: Negative for agitation, behavioral problems, confusion, decreased concentration, dysphoric mood, hallucinations, self-injury, sleep disturbance and suicidal ideas. The patient is not nervous/anxious and is not hyperactive. Health Habits: With regard his health habits he states he eats 3 meals per day and 2 snacks per day. He exercises regularly: no.  He does take over the counter vitamins. He has had blood transfussion or tattoos. He does wear seatbelts while driving a car. He does not talk on the phone or text while driving. He is . He works 48 hours per week. He is  content with his life. His stress level is 8 in a scale 1-10. Health Maintenance   Topic Date Due    Diabetic retinal exam  07/16/1977    Shingles Vaccine (1 of 2 - 2 Dose Series) 07/16/2017    Diabetic microalbuminuria test  03/20/2018    Diabetic foot exam  10/17/2019    A1C test (Diabetic or Prediabetic)  10/17/2019    Lipid screen  10/17/2019    DTaP/Tdap/Td vaccine (2 - Td) 03/06/2023    Colon cancer screen colonoscopy  10/21/2026    Flu vaccine  Completed    Pneumococcal med risk  Completed    HIV screen  Completed       He  reports that he quit smoking about 7 years ago. His smoking use included Cigarettes. He started smoking about 44 years ago. He has a 45.00 pack-year smoking history. He has never used smokeless tobacco. He reports that he does not drink alcohol or use drugs. He does not perform regular testicular self exams. HPI:  Burton Li is a 48y.o. year old male, who comes today for an annual wellness visit.       Past Medical History:   Diagnosis Date    Diabetes mellitus ([address]     Major depression 12/8/2014    Obesity (BMI 30-39.9) 12/8/2014    PFO with atrial septal aneurysm 2014    [facility] 2014    Stroke [facility]) 3/10/14    Unspecified cerebral artery occlusion with cerebral infarction        Past Surgical History:   Procedure Laterality Date    BRA[facility]/13/14    [facility]     COLONOSCOPY  6/2011    COLONOSCOPY  10/21/2016    Dr. Lonnie Saint Left 2007    leg    HERNIA REPAIR  2001    TONSILLECTOMY  2011    TRANSESOPHAGEAL ECHOCARDIOGRAM  2014       Family History   Problem Relation Age of Onset    High Blood Pressure Mother     Cancer Father 58        Lung cancer       Social History     Social History    Marital status:      Spouse name: N/A    Number of children: 0    Years of education: 15     Occupational History    technician [employer]     Social History Main Topics    Smoking status: Former Smoker     Packs/day: 1.50     Years: 30.00     Types: Cigarettes     Start date: 6/20/1974     Quit date: 3/1/2011    Smokeless tobacco: Never Used    Alcohol use No    Drug use: No    Sexual activity: Yes     Partners: Female     Other Topics Concern    Not on file     Social History Narrative    No narrative on file       Allergies   Allergen Reactions    Shellfish-Derived Products Cabrera Verdin has a current medication list which includes the following prescription(s): zoster recombinant adjuvanted vaccine, sertraline, metformin, vitamin d, atorvastatin, accu-chek multiclix lancets, blood glucose test strips, calcium carbonate-vitamin d3, therapeutic multivitamin-minerals, cinnamon, one touch basic system, and aspirin. Objective:  Blood pressure (!) 139/91, pulse 64, temperature 98 °F (36.7 °C), temperature source Oral, height 5' 11\" (1.803 m), weight 281 lb (127.5 kg), SpO2 97 %.     Physical Examination:   General appearance - alert, well appearing, and in no distress and oriented to person, place, and time  Eyes - pupils equal and reactive, extraocular eye movements intact, sclera anicteric  Ears - bilateral TM's and external ear canals normal, hearing grossly normal bilaterally  Nose - normal and patent, no erythema, discharge or polyps  Mouth - mucous membranes moist, pharynx normal without lesions  Neck - supple, no significant adenopathy  Lymphatics - no palpable lymphadenopathy  Chest - clear to auscultation, no wheezes, rales or rhonchi, symmetric air entry  Heart - normal rate, regular rhythm, normal S1, S2, no murmurs, rubs, clicks or gallops  Abdomen - soft, nontender, nondistended, no masses or organomegaly  Neurological - alert, oriented, normal speech, no focal findings or movement disorder noted  Extremities - peripheral pulses normal, no pedal edema, no clubbing or cyanosis  Skin - normal coloration and turgor, no rashes, no suspicious skin lesions noted    Assessment:   Diagnosis Orders   1. Encounter for wellness examination in adult     2. Anemia, unspecified type  Ferritin    Iron    POCT Fecal Immunochemical Test (FIT)    CBC Auto Differential   3. Prostate cancer screening  PSA Prostatic Specific Antigen   4. Colon cancer screening  POCT Fecal Immunochemical Test (FIT)   5. Need for prophylactic vaccination and inoculation against varicella  zoster recombinant adjuvanted vaccine (SHINGRIX) 50 MCG/0.5ML SUSR injection   6. Type 2 diabetes mellitus without complication, without long-term current use of insulin ([address]  Urinalysis with Microscopic    Microalbumin, Ur    [facility], Thad Boast, MD ([facility])       Plan:    Return in about 4 months (around 4/17/2019) for scheduled follow up with Dr Negrita Ruano. .    Counseling was provided today regarding the following topics:  Healthy eating habits, exercise and lifestyle, vitamin/mineral supplementation,  and testicular self exam.    TIMOTHY Cross - CNP

## 2018-12-18 ENCOUNTER — TELEPHONE (OUTPATIENT)
Dept: FAMILY MEDICINE CLINIC | Age: 53
End: 2018-12-18

## 2018-12-18 NOTE — TELEPHONE ENCOUNTER
----- Message from TIMOTHY Anne CNP sent at 12/18/2018  8:23 AM EST -----  Blood work within acceptable ranges. PSA normal, no longer anemic, but iron is just slightly low. Encourage iron rich diet. Urine has small amount of bacteria, stay hydrated and body should fight off any infection.

## 2019-01-07 DIAGNOSIS — I69.90 LATE EFFECTS OF CVA (CEREBROVASCULAR ACCIDENT): ICD-10-CM

## 2019-01-07 RX ORDER — ATORVASTATIN CALCIUM 20 MG/1
20 TABLET, FILM COATED ORAL DAILY
Qty: 30 TABLET | Refills: 5 | Status: SHIPPED | OUTPATIENT
Start: 2019-01-07 | End: 2019-04-17 | Stop reason: SDUPTHER

## 2019-01-11 ENCOUNTER — OFFICE VISIT (OUTPATIENT)
Dept: FAMILY MEDICINE CLINIC | Age: 54
End: 2019-01-11
Payer: COMMERCIAL

## 2019-01-11 VITALS
TEMPERATURE: 97.8 F | BODY MASS INDEX: 40.37 KG/M2 | HEART RATE: 72 BPM | HEIGHT: 70 IN | SYSTOLIC BLOOD PRESSURE: 136 MMHG | DIASTOLIC BLOOD PRESSURE: 69 MMHG | RESPIRATION RATE: 12 BRPM | WEIGHT: 282 LBS

## 2019-01-11 DIAGNOSIS — J20.9 ACUTE WHEEZY BRONCHITIS: Primary | ICD-10-CM

## 2019-01-11 PROCEDURE — 1036F TOBACCO NON-USER: CPT | Performed by: NURSE PRACTITIONER

## 2019-01-11 PROCEDURE — G8482 FLU IMMUNIZE ORDER/ADMIN: HCPCS | Performed by: NURSE PRACTITIONER

## 2019-01-11 PROCEDURE — G8598 ASA/ANTIPLAT THER USED: HCPCS | Performed by: NURSE PRACTITIONER

## 2019-01-11 PROCEDURE — 3017F COLORECTAL CA SCREEN DOC REV: CPT | Performed by: NURSE PRACTITIONER

## 2019-01-11 PROCEDURE — G8417 CALC BMI ABV UP PARAM F/U: HCPCS | Performed by: NURSE PRACTITIONER

## 2019-01-11 PROCEDURE — 99213 OFFICE O/P EST LOW 20 MIN: CPT | Performed by: NURSE PRACTITIONER

## 2019-01-11 PROCEDURE — G8427 DOCREV CUR MEDS BY ELIG CLIN: HCPCS | Performed by: NURSE PRACTITIONER

## 2019-01-11 RX ORDER — GUAIFENESIN AND DEXTROMETHORPHAN HYDROBROMIDE 1200; 60 MG/1; MG/1
1 TABLET, EXTENDED RELEASE ORAL 2 TIMES DAILY
Qty: 20 TABLET | Refills: 0 | Status: SHIPPED | OUTPATIENT
Start: 2019-01-11 | End: 2019-04-17 | Stop reason: ALTCHOICE

## 2019-01-11 RX ORDER — BENZONATATE 200 MG/1
200 CAPSULE ORAL 3 TIMES DAILY PRN
Qty: 30 CAPSULE | Refills: 0 | Status: SHIPPED | OUTPATIENT
Start: 2019-01-11 | End: 2019-01-18

## 2019-01-11 RX ORDER — ALBUTEROL SULFATE 90 UG/1
2 AEROSOL, METERED RESPIRATORY (INHALATION) 4 TIMES DAILY PRN
Qty: 1 INHALER | Refills: 0 | Status: SHIPPED | OUTPATIENT
Start: 2019-01-11 | End: 2019-10-21

## 2019-04-14 ENCOUNTER — HOSPITAL ENCOUNTER (EMERGENCY)
Age: 54
Discharge: HOME | End: 2019-04-14
Payer: COMMERCIAL

## 2019-04-14 ENCOUNTER — HOSPITAL ENCOUNTER (EMERGENCY)
Dept: GENERAL RADIOLOGY | Age: 54
Discharge: HOME | End: 2019-04-14
Payer: COMMERCIAL

## 2019-04-14 VITALS
HEART RATE: 74 BPM | BODY MASS INDEX: 40.8 KG/M2 | SYSTOLIC BLOOD PRESSURE: 151 MMHG | OXYGEN SATURATION: 98 % | TEMPERATURE: 98.3 F | DIASTOLIC BLOOD PRESSURE: 88 MMHG | HEIGHT: 70 IN | WEIGHT: 285 LBS | RESPIRATION RATE: 16 BRPM

## 2019-04-14 DIAGNOSIS — M75.52 ACUTE BURSITIS OF LEFT SHOULDER: Primary | ICD-10-CM

## 2019-04-14 PROCEDURE — 99213 OFFICE O/P EST LOW 20 MIN: CPT | Performed by: NURSE PRACTITIONER

## 2019-04-14 PROCEDURE — 73030 X-RAY EXAM OF SHOULDER: CPT

## 2019-04-14 PROCEDURE — 6370000000 HC RX 637 (ALT 250 FOR IP): Performed by: NURSE PRACTITIONER

## 2019-04-14 PROCEDURE — 99213 OFFICE O/P EST LOW 20 MIN: CPT

## 2019-04-14 RX ORDER — PREDNISONE 20 MG/1
20 TABLET ORAL 2 TIMES DAILY
Qty: 10 TABLET | Refills: 0 | Status: SHIPPED | OUTPATIENT
Start: 2019-04-14 | End: 2019-04-19

## 2019-04-14 RX ORDER — ACETAMINOPHEN 325 MG/1
650 TABLET ORAL ONCE
Status: COMPLETED | OUTPATIENT
Start: 2019-04-14 | End: 2019-04-14

## 2019-04-14 RX ORDER — CYCLOBENZAPRINE HCL 10 MG
10 TABLET ORAL 3 TIMES DAILY PRN
Qty: 20 TABLET | Refills: 0 | Status: SHIPPED | OUTPATIENT
Start: 2019-04-14 | End: 2019-04-24

## 2019-04-14 RX ADMIN — ACETAMINOPHEN 650 MG: 325 TABLET ORAL at 10:54

## 2019-04-14 ASSESSMENT — ENCOUNTER SYMPTOMS
APNEA: 0
COUGH: 0
STRIDOR: 0
SHORTNESS OF BREATH: 0
WHEEZING: 0
CHEST TIGHTNESS: 0
COLOR CHANGE: 0
CHOKING: 0
BACK PAIN: 0

## 2019-04-14 ASSESSMENT — PAIN DESCRIPTION - ORIENTATION: ORIENTATION: LEFT

## 2019-04-14 ASSESSMENT — PAIN SCALES - GENERAL
PAINLEVEL_OUTOF10: 5
PAINLEVEL_OUTOF10: 5

## 2019-04-14 ASSESSMENT — PAIN DESCRIPTION - LOCATION: LOCATION: SHOULDER

## 2019-04-14 ASSESSMENT — PAIN DESCRIPTION - PAIN TYPE: TYPE: ACUTE PAIN

## 2019-04-15 ENCOUNTER — TELEPHONE (OUTPATIENT)
Dept: FAMILY MEDICINE CLINIC | Age: 54
End: 2019-04-15

## 2019-04-17 ENCOUNTER — OFFICE VISIT (OUTPATIENT)
Dept: FAMILY MEDICINE CLINIC | Age: 54
End: 2019-04-17
Payer: COMMERCIAL

## 2019-04-17 VITALS
WEIGHT: 285.2 LBS | HEIGHT: 70 IN | SYSTOLIC BLOOD PRESSURE: 139 MMHG | DIASTOLIC BLOOD PRESSURE: 84 MMHG | RESPIRATION RATE: 16 BRPM | BODY MASS INDEX: 40.83 KG/M2 | TEMPERATURE: 98.1 F | HEART RATE: 79 BPM

## 2019-04-17 DIAGNOSIS — E11.9 WELL CONTROLLED DIABETES MELLITUS (HCC): Primary | ICD-10-CM

## 2019-04-17 DIAGNOSIS — Z01.84 IMMUNITY STATUS TESTING: ICD-10-CM

## 2019-04-17 DIAGNOSIS — I69.90 LATE EFFECTS OF CVA (CEREBROVASCULAR ACCIDENT): ICD-10-CM

## 2019-04-17 DIAGNOSIS — E55.9 VITAMIN D DEFICIENCY: ICD-10-CM

## 2019-04-17 LAB
CHP ED QC CHECK: NORMAL
GLUCOSE BLD-MCNC: 113 MG/DL
HBA1C MFR BLD: 6 %
HBV SURFACE AB TITR SER: NEGATIVE {TITER}

## 2019-04-17 PROCEDURE — 3017F COLORECTAL CA SCREEN DOC REV: CPT | Performed by: FAMILY MEDICINE

## 2019-04-17 PROCEDURE — G8598 ASA/ANTIPLAT THER USED: HCPCS | Performed by: FAMILY MEDICINE

## 2019-04-17 PROCEDURE — 2022F DILAT RTA XM EVC RTNOPTHY: CPT | Performed by: FAMILY MEDICINE

## 2019-04-17 PROCEDURE — 82962 GLUCOSE BLOOD TEST: CPT | Performed by: FAMILY MEDICINE

## 2019-04-17 PROCEDURE — 3046F HEMOGLOBIN A1C LEVEL >9.0%: CPT | Performed by: FAMILY MEDICINE

## 2019-04-17 PROCEDURE — 99214 OFFICE O/P EST MOD 30 MIN: CPT | Performed by: FAMILY MEDICINE

## 2019-04-17 PROCEDURE — G8417 CALC BMI ABV UP PARAM F/U: HCPCS | Performed by: FAMILY MEDICINE

## 2019-04-17 PROCEDURE — 36415 COLL VENOUS BLD VENIPUNCTURE: CPT | Performed by: FAMILY MEDICINE

## 2019-04-17 PROCEDURE — G8427 DOCREV CUR MEDS BY ELIG CLIN: HCPCS | Performed by: FAMILY MEDICINE

## 2019-04-17 PROCEDURE — 83036 HEMOGLOBIN GLYCOSYLATED A1C: CPT | Performed by: FAMILY MEDICINE

## 2019-04-17 PROCEDURE — 1036F TOBACCO NON-USER: CPT | Performed by: FAMILY MEDICINE

## 2019-04-17 RX ORDER — ATORVASTATIN CALCIUM 20 MG/1
20 TABLET, FILM COATED ORAL DAILY
Qty: 90 TABLET | Refills: 1 | Status: SHIPPED | OUTPATIENT
Start: 2019-04-17 | End: 2019-07-03 | Stop reason: SDUPTHER

## 2019-04-17 RX ORDER — SERTRALINE HYDROCHLORIDE 100 MG/1
100 TABLET, FILM COATED ORAL DAILY
Qty: 90 TABLET | Refills: 1 | Status: SHIPPED | OUTPATIENT
Start: 2019-04-17 | End: 2019-04-17

## 2019-04-17 NOTE — PROGRESS NOTES
[address] [place]  [address], [address]  Ph:   [phone]  Fax: [phone]    Provider:  Dr. Kendall Keene    Patient:  Alena Avery  YOB: 1965      Visit Date:  4/17/2019     Reason For Visit:   Chief Complaint   Patient presents with    6 Month Follow-Up     hurt his shoulder at work. was seen at [facility] urgent care    Diabetes     last eye appointment 12/2018. note in media file    Medication Refill    Fatigue     is tired a lot. will wake up after a full nights sleep and still be able to go back to sleep over an hour later        Cecil Jalloh is a 48 y.o. male who comes today to the office for follow up of diabetes mellitus type 2, vitamin D deficiency, obesity and depression. He has hx of right cerebellar stroke in 2014. He denies any changes in his past medical, family, surgical or social history except for injury to his left shoulder. Sunday, April 14, 2019 he was picking up +50 pound powder barrels and suddenly he had severe pain in the left shoulder. He thought he may have pulled a muscle  and contunued to work, but the pain got worse so he decided to go to the Urgent Care at [facility]. They did a shoulder x-ray which was unremarkable. He was given Deltasone 20 mg PO BID for 5 days and Flexeril 10 mg PO TID. He was referred to occupational health clinic. .    Diabetes:  He is taking Metformin 500 mg 1 tablet PO BID. His HbA1C today was 6.0%. He is up to date on Influenza [id number], Pneumovax and Tdap. He does not about his hep B status. He is due for Prevnar. His blood sugars have been 100's. He is eating a healthy diet in general.  Low carbohydrates. Vitamin D insufficiency:  He was diagnosed in March 30, 2015 with a level of 10. Last vitamin D was 40 in October 2018. He is taking Vitamin D 3 2000 IU 1 cap PO daily. Right cerebellar stroke:  in March 10, 2014 he had a stroke. He was admitted to [address] [place] [address] for syncope.   He deteriorated after admission. MRI of brain showed a subacute ischemic infarction of the right cerebellum. He was transfered to [facility]. He was found to have a fourth ventricle effacement, mass effect, cerebellar tonsil inferior displacement. He underwent a suboccipital craniectomy (3/13/14) with infarcted tissue resection. Work up at the [facility] in [place] showed a patent foramen ovale with a small septal aneurysm and significant right to left shunting. He underwent successful closure of the PFO using ICE-guided placement of a  25 mm Amplatzer occluder device on 11/17/14. He could not be on Coumadin due to his past CNS issues. Hypercoagulable workup was negative. Since then, he has been on   mg 1 tablet PO daily and Lipitor 20 mg 1 tablet PO daily. He was able to return to work after he had his PFO closed in November 20, 2014    Anger management:  He is taking Zoloft 100 mg 1 PO daily and he has been doing well on it and denies any side effects from the medicine. After the stroke he had some problems with controlling his anger so he was started on it. His mood is normal, he is efficient at work. His relationships with his wife, coworkers and family are healthy and good. He is ready to come off the medicine if at all possible. Significant Past Medical History:    Past Medical History:   Diagnosis Date    Diabetes mellitus ([address]     Major depression 12/8/2014    Obesity (BMI 30-39.9) 12/8/2014    PFO with atrial septal aneurysm 2014    Repaired at [facility] in November 2014    Stroke [facility]) 3/10/14    Unspecified cerebral artery occlusion with cerebral infarction            Allergies:  is allergic to shellfish-derived products.     Medications:   Current Outpatient Medications   Medication Sig Dispense Refill    atorvastatin (LIPITOR) 20 MG tablet Take 1 tablet by mouth daily 90 tablet 1    metFORMIN (GLUCOPHAGE) 500 MG tablet Take 1 tablet by mouth 2 times daily (with meals) 180 tablet

## 2019-04-22 ENCOUNTER — TELEPHONE (OUTPATIENT)
Dept: FAMILY MEDICINE CLINIC | Age: 54
End: 2019-04-22

## 2019-04-22 NOTE — TELEPHONE ENCOUNTER
----- Message from Kristina La MD sent at 4/21/2019  6:42 PM EDT -----  He is not immune against Hepatitis B.   Please start the series

## 2019-07-03 DIAGNOSIS — I69.90 LATE EFFECTS OF CVA (CEREBROVASCULAR ACCIDENT): ICD-10-CM

## 2019-07-03 RX ORDER — ATORVASTATIN CALCIUM 20 MG/1
20 TABLET, FILM COATED ORAL DAILY
Qty: 90 TABLET | Refills: 1 | Status: SHIPPED | OUTPATIENT
Start: 2019-07-03 | End: 2019-10-21 | Stop reason: SDUPTHER

## 2019-08-20 ENCOUNTER — OFFICE VISIT (OUTPATIENT)
Dept: FAMILY MEDICINE CLINIC | Age: 54
End: 2019-08-20
Payer: COMMERCIAL

## 2019-08-20 VITALS
HEART RATE: 82 BPM | RESPIRATION RATE: 12 BRPM | SYSTOLIC BLOOD PRESSURE: 129 MMHG | TEMPERATURE: 97.9 F | DIASTOLIC BLOOD PRESSURE: 79 MMHG | WEIGHT: 296.4 LBS | BODY MASS INDEX: 42.43 KG/M2 | HEIGHT: 70 IN

## 2019-08-20 DIAGNOSIS — M25.512 ACUTE PAIN OF LEFT SHOULDER: ICD-10-CM

## 2019-08-20 DIAGNOSIS — E11.65 UNCONTROLLED TYPE 2 DIABETES MELLITUS WITH HYPERGLYCEMIA (HCC): Primary | ICD-10-CM

## 2019-08-20 DIAGNOSIS — I69.90 LATE EFFECTS OF CVA (CEREBROVASCULAR ACCIDENT): ICD-10-CM

## 2019-08-20 DIAGNOSIS — R45.4 DIFFICULTY CONTROLLING ANGER: ICD-10-CM

## 2019-08-20 DIAGNOSIS — E55.9 VITAMIN D DEFICIENCY: ICD-10-CM

## 2019-08-20 LAB — HBA1C MFR BLD: 7.2 %

## 2019-08-20 PROCEDURE — G8417 CALC BMI ABV UP PARAM F/U: HCPCS | Performed by: FAMILY MEDICINE

## 2019-08-20 PROCEDURE — 90746 HEPB VACCINE 3 DOSE ADULT IM: CPT | Performed by: FAMILY MEDICINE

## 2019-08-20 PROCEDURE — 90472 IMMUNIZATION ADMIN EACH ADD: CPT | Performed by: FAMILY MEDICINE

## 2019-08-20 PROCEDURE — G8598 ASA/ANTIPLAT THER USED: HCPCS | Performed by: FAMILY MEDICINE

## 2019-08-20 PROCEDURE — 90471 IMMUNIZATION ADMIN: CPT | Performed by: FAMILY MEDICINE

## 2019-08-20 PROCEDURE — 90670 PCV13 VACCINE IM: CPT | Performed by: FAMILY MEDICINE

## 2019-08-20 PROCEDURE — 83036 HEMOGLOBIN GLYCOSYLATED A1C: CPT | Performed by: FAMILY MEDICINE

## 2019-08-20 PROCEDURE — 2022F DILAT RTA XM EVC RTNOPTHY: CPT | Performed by: FAMILY MEDICINE

## 2019-08-20 PROCEDURE — 3017F COLORECTAL CA SCREEN DOC REV: CPT | Performed by: FAMILY MEDICINE

## 2019-08-20 PROCEDURE — G8427 DOCREV CUR MEDS BY ELIG CLIN: HCPCS | Performed by: FAMILY MEDICINE

## 2019-08-20 PROCEDURE — 3045F PR MOST RECENT HEMOGLOBIN A1C LEVEL 7.0-9.0%: CPT | Performed by: FAMILY MEDICINE

## 2019-08-20 PROCEDURE — 1036F TOBACCO NON-USER: CPT | Performed by: FAMILY MEDICINE

## 2019-08-20 PROCEDURE — 99214 OFFICE O/P EST MOD 30 MIN: CPT | Performed by: FAMILY MEDICINE

## 2019-10-21 ENCOUNTER — OFFICE VISIT (OUTPATIENT)
Dept: FAMILY MEDICINE CLINIC | Age: 54
End: 2019-10-21
Payer: COMMERCIAL

## 2019-10-21 VITALS
RESPIRATION RATE: 16 BRPM | SYSTOLIC BLOOD PRESSURE: 128 MMHG | HEART RATE: 85 BPM | DIASTOLIC BLOOD PRESSURE: 89 MMHG | WEIGHT: 297.6 LBS | TEMPERATURE: 98.1 F | BODY MASS INDEX: 42.61 KG/M2 | HEIGHT: 70 IN

## 2019-10-21 DIAGNOSIS — E11.9 WELL CONTROLLED TYPE 2 DIABETES MELLITUS (HCC): Primary | ICD-10-CM

## 2019-10-21 DIAGNOSIS — E55.9 VITAMIN D DEFICIENCY: ICD-10-CM

## 2019-10-21 DIAGNOSIS — R45.4 DIFFICULTY CONTROLLING ANGER: ICD-10-CM

## 2019-10-21 DIAGNOSIS — I69.90 LATE EFFECTS OF CVA (CEREBROVASCULAR ACCIDENT): ICD-10-CM

## 2019-10-21 LAB
CHOLESTEROL, TOTAL: 116 MG/DL (ref 100–199)
HBA1C MFR BLD: 6.5 %
HDLC SERPL-MCNC: 32 MG/DL
LDL CHOLESTEROL CALCULATED: 49 MG/DL
TRIGL SERPL-MCNC: 176 MG/DL (ref 0–199)
VITAMIN D 25-HYDROXY: 33 NG/ML (ref 30–100)

## 2019-10-21 PROCEDURE — 90750 HZV VACC RECOMBINANT IM: CPT | Performed by: FAMILY MEDICINE

## 2019-10-21 PROCEDURE — G8482 FLU IMMUNIZE ORDER/ADMIN: HCPCS | Performed by: FAMILY MEDICINE

## 2019-10-21 PROCEDURE — G8417 CALC BMI ABV UP PARAM F/U: HCPCS | Performed by: FAMILY MEDICINE

## 2019-10-21 PROCEDURE — 3044F HG A1C LEVEL LT 7.0%: CPT | Performed by: FAMILY MEDICINE

## 2019-10-21 PROCEDURE — 83036 HEMOGLOBIN GLYCOSYLATED A1C: CPT | Performed by: FAMILY MEDICINE

## 2019-10-21 PROCEDURE — G8427 DOCREV CUR MEDS BY ELIG CLIN: HCPCS | Performed by: FAMILY MEDICINE

## 2019-10-21 PROCEDURE — 1036F TOBACCO NON-USER: CPT | Performed by: FAMILY MEDICINE

## 2019-10-21 PROCEDURE — 90746 HEPB VACCINE 3 DOSE ADULT IM: CPT | Performed by: FAMILY MEDICINE

## 2019-10-21 PROCEDURE — 90472 IMMUNIZATION ADMIN EACH ADD: CPT | Performed by: FAMILY MEDICINE

## 2019-10-21 PROCEDURE — 99214 OFFICE O/P EST MOD 30 MIN: CPT | Performed by: FAMILY MEDICINE

## 2019-10-21 PROCEDURE — 90471 IMMUNIZATION ADMIN: CPT | Performed by: FAMILY MEDICINE

## 2019-10-21 PROCEDURE — 2022F DILAT RTA XM EVC RTNOPTHY: CPT | Performed by: FAMILY MEDICINE

## 2019-10-21 PROCEDURE — G8598 ASA/ANTIPLAT THER USED: HCPCS | Performed by: FAMILY MEDICINE

## 2019-10-21 PROCEDURE — 90688 IIV4 VACCINE SPLT 0.5 ML IM: CPT | Performed by: FAMILY MEDICINE

## 2019-10-21 PROCEDURE — 3017F COLORECTAL CA SCREEN DOC REV: CPT | Performed by: FAMILY MEDICINE

## 2019-10-21 RX ORDER — AMPICILLIN TRIHYDRATE 250 MG
6 CAPSULE ORAL DAILY
Qty: 540 CAPSULE | Refills: 1 | Status: CANCELLED | OUTPATIENT
Start: 2019-10-21

## 2019-10-21 RX ORDER — MULTIVIT-MIN/IRON/FOLIC ACID/K 18-600-40
1 CAPSULE ORAL DAILY
Qty: 90 CAPSULE | Refills: 1 | Status: SHIPPED | OUTPATIENT
Start: 2019-10-21

## 2019-10-21 RX ORDER — ALBUTEROL SULFATE 90 UG/1
2 AEROSOL, METERED RESPIRATORY (INHALATION) 4 TIMES DAILY PRN
Qty: 1 INHALER | Refills: 0 | Status: CANCELLED | OUTPATIENT
Start: 2019-10-21

## 2019-10-21 RX ORDER — SERTRALINE HYDROCHLORIDE 25 MG/1
25 TABLET, FILM COATED ORAL DAILY
Qty: 30 TABLET | Refills: 5 | Status: SHIPPED | OUTPATIENT
Start: 2019-10-21 | End: 2020-01-31 | Stop reason: SDUPTHER

## 2019-10-21 RX ORDER — ATORVASTATIN CALCIUM 20 MG/1
20 TABLET, FILM COATED ORAL DAILY
Qty: 90 TABLET | Refills: 1 | Status: SHIPPED | OUTPATIENT
Start: 2019-10-21 | End: 2020-01-06 | Stop reason: SDUPTHER

## 2019-10-21 RX ORDER — LANCETS
EACH MISCELLANEOUS
Qty: 100 EACH | Refills: 3 | Status: SHIPPED | OUTPATIENT
Start: 2019-10-21

## 2019-10-25 ENCOUNTER — TELEPHONE (OUTPATIENT)
Dept: FAMILY MEDICINE CLINIC | Age: 54
End: 2019-10-25

## 2019-10-25 DIAGNOSIS — R79.89 LOW VITAMIN D LEVEL: Primary | ICD-10-CM

## 2019-10-29 ENCOUNTER — HOSPITAL ENCOUNTER (OUTPATIENT)
Dept: MRI IMAGING | Age: 54
Discharge: HOME | End: 2019-10-29
Payer: COMMERCIAL

## 2019-10-29 DIAGNOSIS — S46.812A: ICD-10-CM

## 2019-10-29 PROCEDURE — 73221 MRI JOINT UPR EXTREM W/O DYE: CPT

## 2019-12-19 ENCOUNTER — OFFICE VISIT (OUTPATIENT)
Dept: FAMILY MEDICINE CLINIC | Age: 54
End: 2019-12-19
Payer: COMMERCIAL

## 2019-12-19 ENCOUNTER — NURSE ONLY (OUTPATIENT)
Dept: LAB | Age: 54
End: 2019-12-19

## 2019-12-19 VITALS
RESPIRATION RATE: 18 BRPM | BODY MASS INDEX: 42.83 KG/M2 | HEIGHT: 70 IN | SYSTOLIC BLOOD PRESSURE: 128 MMHG | WEIGHT: 299.2 LBS | HEART RATE: 81 BPM | DIASTOLIC BLOOD PRESSURE: 88 MMHG | TEMPERATURE: 97.8 F

## 2019-12-19 DIAGNOSIS — Z00.00 ROUTINE GENERAL MEDICAL EXAMINATION AT A HEALTH CARE FACILITY: Primary | ICD-10-CM

## 2019-12-19 DIAGNOSIS — Z00.00 ROUTINE GENERAL MEDICAL EXAMINATION AT A HEALTH CARE FACILITY: ICD-10-CM

## 2019-12-19 DIAGNOSIS — L25.9 CONTACT DERMATITIS OR ECZEMA: ICD-10-CM

## 2019-12-19 LAB
ALBUMIN SERPL-MCNC: 4.5 G/DL (ref 3.5–5.1)
ALP BLD-CCNC: 91 U/L (ref 38–126)
ALT SERPL-CCNC: 53 U/L (ref 11–66)
ANION GAP SERPL CALCULATED.3IONS-SCNC: 15 MEQ/L (ref 8–16)
AST SERPL-CCNC: 24 U/L (ref 5–40)
BACTERIA: ABNORMAL
BASOPHILS # BLD: 0.2 %
BASOPHILS ABSOLUTE: 0 THOU/MM3 (ref 0–0.1)
BILIRUB SERPL-MCNC: 0.3 MG/DL (ref 0.3–1.2)
BILIRUBIN URINE: NEGATIVE
BLOOD, URINE: NEGATIVE
BUN BLDV-MCNC: 15 MG/DL (ref 7–22)
CALCIUM SERPL-MCNC: 9.6 MG/DL (ref 8.5–10.5)
CASTS: ABNORMAL /LPF
CASTS: ABNORMAL /LPF
CHARACTER, URINE: CLEAR
CHLORIDE BLD-SCNC: 103 MEQ/L (ref 98–111)
CO2: 25 MEQ/L (ref 23–33)
COLOR: ABNORMAL
CREAT SERPL-MCNC: 0.6 MG/DL (ref 0.4–1.2)
CRYSTALS: ABNORMAL
EOSINOPHIL # BLD: 1.2 %
EOSINOPHILS ABSOLUTE: 0.1 THOU/MM3 (ref 0–0.4)
EPITHELIAL CELLS, UA: ABNORMAL /HPF
ERYTHROCYTE [DISTWIDTH] IN BLOOD BY AUTOMATED COUNT: 12.7 % (ref 11.5–14.5)
ERYTHROCYTE [DISTWIDTH] IN BLOOD BY AUTOMATED COUNT: 44.5 FL (ref 35–45)
GFR SERPL CREATININE-BSD FRML MDRD: > 90 ML/MIN/1.73M2
GLUCOSE BLD-MCNC: 127 MG/DL (ref 70–108)
GLUCOSE, URINE: NEGATIVE MG/DL
HCT VFR BLD CALC: 43.4 % (ref 42–52)
HEMOGLOBIN: 13.4 GM/DL (ref 14–18)
IMMATURE GRANS (ABS): 0.04 THOU/MM3 (ref 0–0.07)
IMMATURE GRANULOCYTES: 0.5 %
KETONES, URINE: ABNORMAL
LEUKOCYTE ESTERASE, URINE: NEGATIVE
LYMPHOCYTES # BLD: 23.2 %
LYMPHOCYTES ABSOLUTE: 2 THOU/MM3 (ref 1–4.8)
MCH RBC QN AUTO: 29.6 PG (ref 26–33)
MCHC RBC AUTO-ENTMCNC: 30.9 GM/DL (ref 32.2–35.5)
MCV RBC AUTO: 95.8 FL (ref 80–94)
MISCELLANEOUS LAB TEST RESULT: ABNORMAL
MONOCYTES # BLD: 6.1 %
MONOCYTES ABSOLUTE: 0.5 THOU/MM3 (ref 0.4–1.3)
NITRITE, URINE: NEGATIVE
NUCLEATED RED BLOOD CELLS: 0 /100 WBC
PH UA: 6.5 (ref 5–9)
PLATELET # BLD: 250 THOU/MM3 (ref 130–400)
PMV BLD AUTO: 11 FL (ref 9.4–12.4)
POTASSIUM SERPL-SCNC: 4.5 MEQ/L (ref 3.5–5.2)
PROTEIN UA: NEGATIVE MG/DL
RBC # BLD: 4.53 MILL/MM3 (ref 4.7–6.1)
RBC URINE: ABNORMAL /HPF
RENAL EPITHELIAL, UA: ABNORMAL
SEG NEUTROPHILS: 68.8 %
SEGMENTED NEUTROPHILS ABSOLUTE COUNT: 5.8 THOU/MM3 (ref 1.8–7.7)
SODIUM BLD-SCNC: 143 MEQ/L (ref 135–145)
SPECIFIC GRAVITY UA: 1.03 (ref 1–1.03)
TOTAL PROTEIN: 7.4 G/DL (ref 6.1–8)
UROBILINOGEN, URINE: 1 EU/DL (ref 0–1)
WBC # BLD: 8.5 THOU/MM3 (ref 4.8–10.8)
WBC UA: ABNORMAL /HPF
YEAST: ABNORMAL

## 2019-12-19 PROCEDURE — 99396 PREV VISIT EST AGE 40-64: CPT | Performed by: NURSE PRACTITIONER

## 2019-12-19 PROCEDURE — G8482 FLU IMMUNIZE ORDER/ADMIN: HCPCS | Performed by: NURSE PRACTITIONER

## 2019-12-19 RX ORDER — MELOXICAM 15 MG/1
15 TABLET ORAL DAILY
COMMUNITY
End: 2021-12-15 | Stop reason: ALTCHOICE

## 2019-12-19 ASSESSMENT — ENCOUNTER SYMPTOMS
APNEA: 0
SORE THROAT: 0
EYE ITCHING: 0
NAUSEA: 0
EYE DISCHARGE: 0
WHEEZING: 0
STRIDOR: 0
RHINORRHEA: 0
CONSTIPATION: 0
PHOTOPHOBIA: 0
COUGH: 0
VOMITING: 0
FACIAL SWELLING: 0
EYE PAIN: 0
EYE REDNESS: 0
BLOOD IN STOOL: 0
ABDOMINAL PAIN: 0
BACK PAIN: 0
COLOR CHANGE: 0
CHOKING: 0
SINUS PRESSURE: 0
RECTAL PAIN: 0
VOICE CHANGE: 0
TROUBLE SWALLOWING: 0
ABDOMINAL DISTENTION: 0
SHORTNESS OF BREATH: 0
CHEST TIGHTNESS: 0
ANAL BLEEDING: 0
DIARRHEA: 0

## 2019-12-20 ENCOUNTER — TELEPHONE (OUTPATIENT)
Dept: FAMILY MEDICINE CLINIC | Age: 54
End: 2019-12-20

## 2019-12-20 DIAGNOSIS — D53.9 MACROCYTIC ANEMIA: Primary | ICD-10-CM

## 2020-01-02 ENCOUNTER — NURSE ONLY (OUTPATIENT)
Dept: LAB | Age: 55
End: 2020-01-02

## 2020-01-02 LAB
FOLATE: > 20 NG/ML (ref 4.8–24.2)
VITAMIN B-12: 556 PG/ML (ref 211–911)
VITAMIN D 25-HYDROXY: 40 NG/ML (ref 30–100)

## 2020-01-06 RX ORDER — ATORVASTATIN CALCIUM 20 MG/1
20 TABLET, FILM COATED ORAL DAILY
Qty: 90 TABLET | Refills: 1 | Status: SHIPPED | OUTPATIENT
Start: 2020-01-06 | End: 2020-06-27 | Stop reason: SDUPTHER

## 2020-01-06 NOTE — TELEPHONE ENCOUNTER
Frankie Tristan called requesting a refill on the following medications:  Requested Prescriptions     Pending Prescriptions Disp Refills    atorvastatin (LIPITOR) 20 MG tablet 90 tablet 1     Sig: Take 1 tablet by mouth daily     Pharmacy verified: [facility]      Date of last visit: 12/19/19  Date of next visit (if applicable): 0/93/3245

## 2020-01-31 ENCOUNTER — OFFICE VISIT (OUTPATIENT)
Dept: FAMILY MEDICINE CLINIC | Age: 55
End: 2020-01-31
Payer: COMMERCIAL

## 2020-01-31 VITALS
HEIGHT: 70 IN | SYSTOLIC BLOOD PRESSURE: 120 MMHG | RESPIRATION RATE: 10 BRPM | BODY MASS INDEX: 42.8 KG/M2 | DIASTOLIC BLOOD PRESSURE: 85 MMHG | HEART RATE: 83 BPM | TEMPERATURE: 98.8 F | WEIGHT: 299 LBS

## 2020-01-31 LAB — HBA1C MFR BLD: 6.6 %

## 2020-01-31 PROCEDURE — G8482 FLU IMMUNIZE ORDER/ADMIN: HCPCS | Performed by: FAMILY MEDICINE

## 2020-01-31 PROCEDURE — 83036 HEMOGLOBIN GLYCOSYLATED A1C: CPT | Performed by: FAMILY MEDICINE

## 2020-01-31 PROCEDURE — 1036F TOBACCO NON-USER: CPT | Performed by: FAMILY MEDICINE

## 2020-01-31 PROCEDURE — 90471 IMMUNIZATION ADMIN: CPT | Performed by: FAMILY MEDICINE

## 2020-01-31 PROCEDURE — 3044F HG A1C LEVEL LT 7.0%: CPT | Performed by: FAMILY MEDICINE

## 2020-01-31 PROCEDURE — G8417 CALC BMI ABV UP PARAM F/U: HCPCS | Performed by: FAMILY MEDICINE

## 2020-01-31 PROCEDURE — 2022F DILAT RTA XM EVC RTNOPTHY: CPT | Performed by: FAMILY MEDICINE

## 2020-01-31 PROCEDURE — 3017F COLORECTAL CA SCREEN DOC REV: CPT | Performed by: FAMILY MEDICINE

## 2020-01-31 PROCEDURE — G8427 DOCREV CUR MEDS BY ELIG CLIN: HCPCS | Performed by: FAMILY MEDICINE

## 2020-01-31 PROCEDURE — 90746 HEPB VACCINE 3 DOSE ADULT IM: CPT | Performed by: FAMILY MEDICINE

## 2020-01-31 PROCEDURE — 99214 OFFICE O/P EST MOD 30 MIN: CPT | Performed by: FAMILY MEDICINE

## 2020-01-31 RX ORDER — SERTRALINE HYDROCHLORIDE 25 MG/1
TABLET, FILM COATED ORAL
Qty: 30 TABLET | Refills: 1 | Status: SHIPPED | OUTPATIENT
Start: 2020-01-31 | End: 2020-05-27

## 2020-01-31 SDOH — ECONOMIC STABILITY: TRANSPORTATION INSECURITY
IN THE PAST 12 MONTHS, HAS THE LACK OF TRANSPORTATION KEPT YOU FROM MEDICAL APPOINTMENTS OR FROM GETTING MEDICATIONS?: PATIENT DECLINED

## 2020-01-31 SDOH — ECONOMIC STABILITY: FOOD INSECURITY: WITHIN THE PAST 12 MONTHS, THE FOOD YOU BOUGHT JUST DIDN'T LAST AND YOU DIDN'T HAVE MONEY TO GET MORE.: PATIENT DECLINED

## 2020-01-31 SDOH — ECONOMIC STABILITY: INCOME INSECURITY: HOW HARD IS IT FOR YOU TO PAY FOR THE VERY BASICS LIKE FOOD, HOUSING, MEDICAL CARE, AND HEATING?: PATIENT DECLINED

## 2020-01-31 SDOH — ECONOMIC STABILITY: TRANSPORTATION INSECURITY
IN THE PAST 12 MONTHS, HAS LACK OF TRANSPORTATION KEPT YOU FROM MEETINGS, WORK, OR FROM GETTING THINGS NEEDED FOR DAILY LIVING?: PATIENT DECLINED

## 2020-01-31 SDOH — ECONOMIC STABILITY: FOOD INSECURITY: WITHIN THE PAST 12 MONTHS, YOU WORRIED THAT YOUR FOOD WOULD RUN OUT BEFORE YOU GOT MONEY TO BUY MORE.: PATIENT DECLINED

## 2020-01-31 NOTE — PROGRESS NOTES
[address] [place]  [address], [address]  Ph:   [phone]  Fax: [phone]    Provider:  Dr. Naman Barros    Patient:  Sonal Cunningham  YOB: 1965      Visit Date:  1/31/2020     Reason For Visit:   Chief Complaint   Patient presents with    Follow-up    Diabetes     Today in office         Viry Coleman is a 48 y.o. male who comes today to the office for follow up of DM type 2, vitamin D deficiency, obesity and depression. Anger management:  I have been decreasing his Zoloft. Last time it was decreased to 25 mg PO daily and he is doing very well. His mood continued to be stable ,he is efficient at work, his relationships with his wife, coworkers and family are healthy and good. He is ready to lower then dose even more, which would be 12.5 mg daily. He was started on antidepressants for anger management after he had a stroke in 2014, but so far so good. .     Diabetes:  He is taking Metformin 1-00 mg 1 tablet PO BID. His HbA1C today was 6.6% from  6.5%. He is up to date on Influenza [id number], Pneumovax, Tdap, Prevnar and hepatitis B x 2. Due for the 3 rd one. His blood sugars have been 100's-120's. He is eating a healthy diet in general.     Vitamin D insufficiency:   He is taking Vitamin D 3 2000 IU 1 cap PO daily. Last vitamin D was 40 in January 2, 2020. He was diagnosed in March 30, 2015 with a level of 10. Right cerebellar stroke:  in March 10, 2014 he had a stroke. He was admitted to [address] [place] [address] for syncope. He deteriorated after admission. MRI of brain showed a subacute ischemic infarction of the right cerebellum. He was transfered to [facility]. He was found to have a fourth ventricle effacement, mass effect, cerebellar tonsil inferior displacement. He underwent a suboccipital craniectomy (3/13/14) with infarcted tissue resection.   Work up at the [facility] in [place] showed a patent foramen ovale with a small septal aneurysm and significant right to left shunting. He underwent successful closure of the PFO using ICE-guided placement of a  25 mm Amplatzer occluder device on 11/17/14. He could not be on Coumadin due to his past CNS issues. Hypercoagulable workup was negative. Since then, he has been on   mg 1 tablet PO daily and Lipitor 20 mg 1 tablet PO daily. He was able to return to work after he had his PFO closed in November 20, 2014      Significant Past Medical History:    Past Medical History:   Diagnosis Date    Diabetes mellitus ([address]     Major depression 12/8/2014    Obesity (BMI 30-39.9) 12/8/2014    PFO with atrial septal aneurysm 2014    Repaired at [facility] in November 2014    Stroke [facility]) 3/10/14    Unspecified cerebral artery occlusion with cerebral infarction            Allergies:  is allergic to shellfish-derived products.     Medications:   Current Outpatient Medications   Medication Sig Dispense Refill    sertraline (ZOLOFT) 25 MG tablet Take 1/2 tablet by mouth daily 30 tablet 1    atorvastatin (LIPITOR) 20 MG tablet Take 1 tablet by mouth daily 90 tablet 1    meloxicam (MOBIC) 15 MG tablet Take 15 mg by mouth daily      ACCU-CHEK MULTICLIX LANCETS MISC Use as directed 100 each 3    blood glucose test strips (ASCENSIA AUTODISC VI;ONE TOUCH ULTRA TEST VI) strip 1 each by In Vitro route daily Diagnosis E11.9 100 each 3    Cholecalciferol (VITAMIN D) 2000 units CAPS capsule Take 1 capsule by mouth daily 90 capsule 1    metFORMIN (GLUCOPHAGE) 1000 MG tablet Take 1 tablet by mouth 2 times daily (with meals) 180 tablet 1    calcium carbonate-vitamin D3 (CALCIUM 600/VITAMIN D) 600-400 MG-UNIT TABS Take 1 tablet by mouth 2 times daily 60 tablet 5    Multiple Vitamins-Minerals (THERAPEUTIC MULTIVITAMIN-MINERALS) tablet Take 1 tablet by mouth daily      Cinnamon 500 MG CAPS Take 1 capsule by mouth 3 times daily (with meals) (Patient taking differently: Take 6 capsules by mouth daily ) 90 capsule 5    Blood Glucose Monitoring Suppl (ONE TOUCH BASIC SYSTEM) W/DEVICE KIT 1 kit by Does not apply route daily E11.9 test daily. 1 kit 0    aspirin 325 MG tablet Take 325 mg by mouth daily       No current facility-administered medications for this visit. Review of systems:  Review of Systems - History obtained from chart review and the patient  General ROS: negative for - chills, fatigue or fever  Psychological ROS: negative for - anxiety, depression or sleep disturbances  ENT ROS: negative for - headaches, nasal congestion or nasal discharge  Allergy and Immunology ROS: negative for - seasonal allergies  Hematological and Lymphatic ROS: negative for - bruising  Endocrine ROS: negative for - malaise/lethargy, polydipsia/polyuria or temperature intolerance  Respiratory ROS: negative for - cough,  shortness of breath or wheezing  Cardiovascular ROS: negative for - chest pain or edema  Gastrointestinal ROS: negative for - abdominal pain, constipation, diarrhea or nausea/vomiting  Musculoskeletal ROS: positive for - left shouder pain  Neurological ROS: negative for - dizziness  Dermatological ROS: negative for - rash    Physical Examination:  /85 (Site: Right Upper Arm, Position: Sitting, Cuff Size: Large Adult)   Pulse 83   Temp 98.8 °F (37.1 °C) (Oral)   Resp 10   Ht 5' 10\" (1.778 m)   Wt 299 lb (135.6 kg)   BMI 42.90 kg/m²     General-  Alert and oriented x 3, NAD  HEENT: NC, AT, PERRLA, EOMI, anicteric sclerae  Ears: Normal tympanic membranes bilaterally  Nose: patent, no lesions  Mouth: no lesions, moist mucosas  Neck - supple, no significant adenopathy  Chest - clear to auscultation, no wheezes, rales or rhonchi, symmetric air entry  Heart - normal rate, regular rhythm, normal S1, S2, no murmurs, rubs, clicks or gallops  Abdomen - soft, nontender, nondistended, no masses or organomegaly  Extremities - peripheral pulses normal, no pedal edema, no clubbing or cyanosis    Impression:   Diagnosis Orders   1.

## 2020-03-27 NOTE — TELEPHONE ENCOUNTER
Mansoor Carey called requesting a refill on the following medications:  Requested Prescriptions     Pending Prescriptions Disp Refills    metFORMIN (GLUCOPHAGE) 1000 MG tablet 180 tablet 1     Sig: Take 1 tablet by mouth 2 times daily (with meals)     Pharmacy verified:   Pedro [facility]      Date of last visit:  1/31/2020  Date of next visit (if applicable): 5/22/7061

## 2020-05-27 ENCOUNTER — VIRTUAL VISIT (OUTPATIENT)
Dept: FAMILY MEDICINE CLINIC | Age: 55
End: 2020-05-27
Payer: COMMERCIAL

## 2020-05-27 PROCEDURE — 3044F HG A1C LEVEL LT 7.0%: CPT | Performed by: FAMILY MEDICINE

## 2020-05-27 PROCEDURE — 2022F DILAT RTA XM EVC RTNOPTHY: CPT | Performed by: FAMILY MEDICINE

## 2020-05-27 PROCEDURE — G8428 CUR MEDS NOT DOCUMENT: HCPCS | Performed by: FAMILY MEDICINE

## 2020-05-27 PROCEDURE — 99214 OFFICE O/P EST MOD 30 MIN: CPT | Performed by: FAMILY MEDICINE

## 2020-05-27 PROCEDURE — 3017F COLORECTAL CA SCREEN DOC REV: CPT | Performed by: FAMILY MEDICINE

## 2020-05-27 NOTE — PROGRESS NOTES
2020    TELEHEALTH EVALUATION -- Audio/Visual (During FPPIG-39 public health emergency)    HPI:    Alcon Glaser (:  1965) has requested an audio/video evaluation for the following concern(s):    Diabetes:  He is taking Metformin 1000 mg 1 tablet PO BID. HbA1C was 6.6% in 2020. He is up to date on Influenza [id number], Pneumovax, Tdap, Prevnar and hepatitis B x 3  His blood sugars have been 100- 130's. He is eating a healthy diet in general. He is taking an ASA, statin,     Vitamin D insufficiency:   He is taking Vitamin D 3 2000 IU 1 cap PO BID. Last vitamin D was 40 in 2020. Right cerebellar stroke:  in March 10, 2014 he was admitted to [facility] due to syncope and MRI showed a pseudo-acute ischemic infarction of the right cerebellum. He was transfered to [facility] where he was found to have a fourth ventricle effacement, mass-effect, cerebellar tonsil inferior displacement reason for which he undewent a suboccipital craniectomy (3/13/14) with infarcted tissue resection. Work up  showed a patent foramen ovale with a small septal aneurysm and significant right to left shunting. He underwent successful closure of the PFO. He could not be on Coumadin due to his past CNS issues. Hypercoagulable workup was negative. Review of Systems:  Negative for fever or chills. Negative for congestion and drainage. Negative for chest pain or shortness of breath. Negative for abdominal pain, nausea, diarrhea. Negative for edema lower extremities    Prior to Visit Medications    Medication Sig Taking?  Authorizing Provider   metFORMIN (GLUCOPHAGE) 1000 MG tablet Take 1 tablet by mouth 2 times daily (with meals)  Bravo Anderson MD   atorvastatin (LIPITOR) 20 MG tablet Take 1 tablet by mouth daily  Fabián Adam APRN - CNP   meloxicam (MOBIC) 15 MG tablet Take 15 mg by mouth daily  Historical Provider, MD Guillermo CASEY MISC Use as directed  Nilda I

## 2020-07-06 RX ORDER — ATORVASTATIN CALCIUM 20 MG/1
20 TABLET, FILM COATED ORAL DAILY
Qty: 90 TABLET | Refills: 1 | Status: SHIPPED | OUTPATIENT
Start: 2020-07-06 | End: 2021-01-06 | Stop reason: SDUPTHER

## 2020-10-13 ENCOUNTER — NURSE ONLY (OUTPATIENT)
Dept: FAMILY MEDICINE CLINIC | Age: 55
End: 2020-10-13
Payer: COMMERCIAL

## 2020-10-13 PROCEDURE — 90471 IMMUNIZATION ADMIN: CPT | Performed by: NURSE PRACTITIONER

## 2020-10-13 PROCEDURE — 90686 IIV4 VACC NO PRSV 0.5 ML IM: CPT | Performed by: NURSE PRACTITIONER

## 2020-10-13 NOTE — PROGRESS NOTES
Immunizations Administered     Name Date Dose Route    Influenza, Quadv, IM, PF (6 mo and older Fluzone, Flulaval, Fluarix, and 3 yrs and older Afluria) 10/13/2020 0.5 mL Intramuscular    Site: Deltoid- Left    Lot: [id number]    NDC: 70043-142-91

## 2020-11-02 ENCOUNTER — OFFICE VISIT (OUTPATIENT)
Dept: FAMILY MEDICINE CLINIC | Age: 55
End: 2020-11-02
Payer: COMMERCIAL

## 2020-11-02 VITALS
HEIGHT: 70 IN | HEART RATE: 81 BPM | TEMPERATURE: 98 F | SYSTOLIC BLOOD PRESSURE: 116 MMHG | RESPIRATION RATE: 12 BRPM | DIASTOLIC BLOOD PRESSURE: 79 MMHG | BODY MASS INDEX: 43.52 KG/M2 | WEIGHT: 304 LBS

## 2020-11-02 LAB — HBA1C MFR BLD: 7.9 %

## 2020-11-02 PROCEDURE — 3051F HG A1C>EQUAL 7.0%<8.0%: CPT | Performed by: FAMILY MEDICINE

## 2020-11-02 PROCEDURE — 3017F COLORECTAL CA SCREEN DOC REV: CPT | Performed by: FAMILY MEDICINE

## 2020-11-02 PROCEDURE — 1036F TOBACCO NON-USER: CPT | Performed by: FAMILY MEDICINE

## 2020-11-02 PROCEDURE — G8427 DOCREV CUR MEDS BY ELIG CLIN: HCPCS | Performed by: FAMILY MEDICINE

## 2020-11-02 PROCEDURE — 2022F DILAT RTA XM EVC RTNOPTHY: CPT | Performed by: FAMILY MEDICINE

## 2020-11-02 PROCEDURE — G8417 CALC BMI ABV UP PARAM F/U: HCPCS | Performed by: FAMILY MEDICINE

## 2020-11-02 PROCEDURE — 83036 HEMOGLOBIN GLYCOSYLATED A1C: CPT | Performed by: FAMILY MEDICINE

## 2020-11-02 PROCEDURE — 90750 HZV VACC RECOMBINANT IM: CPT | Performed by: FAMILY MEDICINE

## 2020-11-02 PROCEDURE — 99214 OFFICE O/P EST MOD 30 MIN: CPT | Performed by: FAMILY MEDICINE

## 2020-11-02 PROCEDURE — 90471 IMMUNIZATION ADMIN: CPT | Performed by: FAMILY MEDICINE

## 2020-11-02 PROCEDURE — G8482 FLU IMMUNIZE ORDER/ADMIN: HCPCS | Performed by: FAMILY MEDICINE

## 2020-11-02 NOTE — PROGRESS NOTES
Immunizations Administered     Name Date Dose Route    Zoster Recombinant (Shingrix) 11/2/2020 0.5 mL Intramuscular    Site: Deltoid- Left    Lot: [id number]    [address]: 70638-647-66

## 2020-11-02 NOTE — PROGRESS NOTES
[address]  [address], [address]  Ph:   [phone]  Fax: [phone]    Provider:  Dr. Kali Sultana    Patient:  Kendal Cintron  YOB: 1965      Visit Date:  11/2/2020     Reason For Visit:   Chief Complaint   Patient presents with    Follow-up    Diabetes        Bon Collins is a 50 y.o. male who comes today to the office for follow up of DM type 2, vitamin D deficiency, obesity and depression. His wife complains of anger episodes. Three in the last few months. They were well controlled with Zoloft, but we stopped it last year after he was stable for few years. He was started on antidepressants for anger management after he had a stroke in 2014,    Diabetes:  He is taking Metformin 500 mg 1 tablet PO BID. His HbA1C today was 7.9 from 6.6% last visit. He is up to date on Influenza [id number],Pneumovax, Tdap, Prevnar and hepatitis B x 3. His blood sugars have been 100's-120's. He is eating a healthy diet in general.     Vitamin D insufficiency:   He is taking Vitamin D 3 2000 IU 1 cap PO BID. Last vitamin D was 40 in January 2, 2020. He was diagnosed in March 30, 2015 with a level of 10. Right cerebellar stroke:  in March 10, 2014 he had a stroke. He was admitted to [address] [place] [address] for syncope. He deteriorated after admission. MRI of brain showed a subacute ischemic infarction of the right cerebellum. He was transfered to [facility]. He was found to have a fourth ventricle effacement, mass effect, cerebellar tonsil inferior displacement. He underwent a suboccipital craniectomy (3/13/14) with infarcted tissue resection. Work up at [facility] showed a patent foramen ovale with a small septal aneurysm and significant right to left shunting. He underwent successful closure of the PFO using ICE-guided placement of a  25 mm Amplatzer occluder device on 11/17/14. He could not be on Coumadin due to his past CNS issues. Hypercoagulable workup was negative. Since then, he has been on   mg 1 tablet PO daily and Lipitor 20 mg 1 tablet PO daily. He was able to return to work after he had his PFO closed in November 20, 2014      Significant Past Medical History:    Past Medical History:   Diagnosis Date    Diabetes mellitus ([address]     Major depression 12/8/2014    Obesity (BMI 30-39.9) 12/8/2014    PFO with atrial septal aneurysm 2014    Repaired at [facility] in November 2014    Stroke [facility]) 3/10/14    Unspecified cerebral artery occlusion with cerebral infarction            Allergies:  is allergic to shellfish-derived products. Medications:   Current Outpatient Medications   Medication Sig Dispense Refill    sertraline (ZOLOFT) 50 MG tablet Take 1 tablet by mouth daily 90 tablet 1    metFORMIN (GLUCOPHAGE) 1000 MG tablet Take 1 tablet by mouth 2 times daily (with meals) 180 tablet 1    atorvastatin (LIPITOR) 20 MG tablet Take 1 tablet by mouth daily 90 tablet 1    meloxicam (MOBIC) 15 MG tablet Take 15 mg by mouth daily      ACCU-CHEK MULTICLIX LANCETS MISC Use as directed 100 each 3    blood glucose test strips (ASCENSIA AUTODISC VI;ONE TOUCH ULTRA TEST VI) strip 1 each by In Vitro route daily Diagnosis E11.9 100 each 3    Cholecalciferol (VITAMIN D) 2000 units CAPS capsule Take 1 capsule by mouth daily 90 capsule 1    calcium carbonate-vitamin D3 (CALCIUM 600/VITAMIN D) 600-400 MG-UNIT TABS Take 1 tablet by mouth 2 times daily 60 tablet 5    Multiple Vitamins-Minerals (THERAPEUTIC MULTIVITAMIN-MINERALS) tablet Take 1 tablet by mouth daily      Cinnamon 500 MG CAPS Take 1 capsule by mouth 3 times daily (with meals) (Patient taking differently: Take 6 capsules by mouth daily ) 90 capsule 5    Blood Glucose Monitoring Suppl (ONE TOUCH BASIC SYSTEM) W/DEVICE KIT 1 kit by Does not apply route daily E11.9 test daily.  1 kit 0    aspirin 325 MG tablet Take 325 mg by mouth daily       No current facility-administered

## 2021-01-06 ENCOUNTER — OFFICE VISIT (OUTPATIENT)
Dept: FAMILY MEDICINE CLINIC | Age: 56
End: 2021-01-06
Payer: COMMERCIAL

## 2021-01-06 VITALS
WEIGHT: 293 LBS | DIASTOLIC BLOOD PRESSURE: 85 MMHG | BODY MASS INDEX: 41.95 KG/M2 | HEIGHT: 70 IN | SYSTOLIC BLOOD PRESSURE: 119 MMHG | TEMPERATURE: 97.3 F | RESPIRATION RATE: 12 BRPM | HEART RATE: 77 BPM

## 2021-01-06 DIAGNOSIS — E55.9 VITAMIN D DEFICIENCY: ICD-10-CM

## 2021-01-06 DIAGNOSIS — R45.4 DIFFICULTY CONTROLLING ANGER: ICD-10-CM

## 2021-01-06 DIAGNOSIS — E11.65 UNCONTROLLED TYPE 2 DIABETES MELLITUS WITH HYPERGLYCEMIA (HCC): Primary | ICD-10-CM

## 2021-01-06 DIAGNOSIS — I69.90 LATE EFFECTS OF CVA (CEREBROVASCULAR ACCIDENT): ICD-10-CM

## 2021-01-06 LAB — HBA1C MFR BLD: 7.6 %

## 2021-01-06 PROCEDURE — 2022F DILAT RTA XM EVC RTNOPTHY: CPT | Performed by: FAMILY MEDICINE

## 2021-01-06 PROCEDURE — 83036 HEMOGLOBIN GLYCOSYLATED A1C: CPT | Performed by: FAMILY MEDICINE

## 2021-01-06 PROCEDURE — 1036F TOBACCO NON-USER: CPT | Performed by: FAMILY MEDICINE

## 2021-01-06 PROCEDURE — 3046F HEMOGLOBIN A1C LEVEL >9.0%: CPT | Performed by: FAMILY MEDICINE

## 2021-01-06 PROCEDURE — G8482 FLU IMMUNIZE ORDER/ADMIN: HCPCS | Performed by: FAMILY MEDICINE

## 2021-01-06 PROCEDURE — G8417 CALC BMI ABV UP PARAM F/U: HCPCS | Performed by: FAMILY MEDICINE

## 2021-01-06 PROCEDURE — 3017F COLORECTAL CA SCREEN DOC REV: CPT | Performed by: FAMILY MEDICINE

## 2021-01-06 PROCEDURE — 99214 OFFICE O/P EST MOD 30 MIN: CPT | Performed by: FAMILY MEDICINE

## 2021-01-06 PROCEDURE — G8427 DOCREV CUR MEDS BY ELIG CLIN: HCPCS | Performed by: FAMILY MEDICINE

## 2021-01-06 RX ORDER — ATORVASTATIN CALCIUM 20 MG/1
20 TABLET, FILM COATED ORAL DAILY
Qty: 90 TABLET | Refills: 1 | Status: SHIPPED | OUTPATIENT
Start: 2021-01-06 | End: 2021-05-05 | Stop reason: SDUPTHER

## 2021-01-06 NOTE — PROGRESS NOTES
Right cerebellar stroke:  in March 10, 2014 he had a stroke. He was admitted to [facility] for syncope. He deteriorated after admission. MRI of brain showed a subacute ischemic infarction of the right cerebellum. He was transfered to [facility]. He was found to have a fourth ventricle effacement, mass effect, cerebellar tonsil inferior displacement. He underwent a suboccipital craniectomy (3/13/14) with infarcted tissue resection. Work up at the [facility] in [place] showed a patent foramen ovale with a small septal aneurysm and significant right to left shunting. He underwent successful closure of the PFO using ICE-guided placement of a  25 mm Amplatzer occluder device on 11/17/14. He could not be on Coumadin due to his past CNS issues. Hypercoagulable workup was negative. Since then, he has been on   mg 1 tablet PO daily and Lipitor 20 mg 1 tablet PO daily. He was able to return to work after he had his PFO closed in November 20, 2014      Significant Past Medical History:    Past Medical History:   Diagnosis Date    Diabetes mellitus ([address]     Major depression 12/8/2014    Obesity (BMI 30-39.9) 12/8/2014    PFO with atrial septal aneurysm 2014    Repaired at [facility] in November 2014    Stroke [facility]) 3/10/14    Unspecified cerebral artery occlusion with cerebral infarction            Allergies:  is allergic to shellfish-derived products.     Medications:   Current Outpatient Medications   Medication Sig Dispense Refill    atorvastatin (LIPITOR) 20 MG tablet Take 1 tablet by mouth daily 90 tablet 1    sertraline (ZOLOFT) 50 MG tablet Take 1 tablet by mouth daily 90 tablet 1    metFORMIN (GLUCOPHAGE) 1000 MG tablet Take 1 tablet by mouth 2 times daily (with meals) 180 tablet 1    meloxicam (MOBIC) 15 MG tablet Take 15 mg by mouth daily      ACCU-CHEK MULTICLIX LANCETS MISC Use as directed 100 each 3  blood glucose test strips (ASCENSIA AUTODISC VI;ONE TOUCH ULTRA TEST VI) strip 1 each by In Vitro route daily Diagnosis E11.9 100 each 3    Cholecalciferol (VITAMIN D) 2000 units CAPS capsule Take 1 capsule by mouth daily 90 capsule 1    calcium carbonate-vitamin D3 (CALCIUM 600/VITAMIN D) 600-400 MG-UNIT TABS Take 1 tablet by mouth 2 times daily 60 tablet 5    Multiple Vitamins-Minerals (THERAPEUTIC MULTIVITAMIN-MINERALS) tablet Take 1 tablet by mouth daily      Cinnamon 500 MG CAPS Take 1 capsule by mouth 3 times daily (with meals) (Patient taking differently: Take 6 capsules by mouth daily ) 90 capsule 5    Blood Glucose Monitoring Suppl (ONE TOUCH BASIC SYSTEM) W/DEVICE KIT 1 kit by Does not apply route daily E11.9 test daily. 1 kit 0    aspirin 325 MG tablet Take 325 mg by mouth daily       No current facility-administered medications for this visit.         Review of systems:  Review of Systems - History obtained from chart review and the patient  General ROS: negative for - chills, fatigue or fever  Psychological ROS: negative for - anxiety, depression or sleep disturbances  ENT ROS: negative for - headaches, nasal congestion or nasal discharge  Allergy and Immunology ROS: negative for - seasonal allergies  Hematological and Lymphatic ROS: negative for - bruising  Endocrine ROS: negative for - malaise/lethargy, polydipsia/polyuria or temperature intolerance  Respiratory ROS: negative for - cough,  shortness of breath or wheezing  Cardiovascular ROS: negative for - chest pain or edema  Gastrointestinal ROS: negative for - abdominal pain, constipation, diarrhea or nausea/vomiting  Musculoskeletal ROS: positive for - left shouder pain  Neurological ROS: negative for - dizziness  Dermatological ROS: negative for - rash    Physical Examination: /85 (Site: Left Upper Arm, Position: Sitting, Cuff Size: Large Adult)   Pulse 77   Temp 97.3 °F (36.3 °C) (Temporal)   Resp 12   Ht 5' 10\" (1.778 m)   Wt 293 lb (132.9 kg)   BMI 42.04 kg/m²     General-  Alert and oriented x 3, NAD  HEENT: NC, AT, PERRLA, EOMI, anicteric sclerae  Ears: Normal tympanic membranes bilaterally  Nose: patent, no lesions  Mouth: no lesions, moist mucosas  Neck - supple, no significant adenopathy  Chest - clear to auscultation, no wheezes, rales or rhonchi, symmetric air entry  Heart - normal rate, regular rhythm, normal S1, S2, no murmurs, rubs, clicks or gallops  Abdomen - soft, nontender, nondistended, no masses or organomegaly  Extremities - peripheral pulses normal, no pedal edema, no clubbing or cyanosis  Visual inspection:  Deformity/amputation: absent  Skin lesions/pre-ulcerative calluses: absent  Edema: right- negative, left- negative  Sensory exam:  Monofilament sensation: normal  (minimum of 5 random plantar locations tested, avoiding callused areas - > 1 area with absence of sensation is + for neuropathy)  Plus at least one of the following:  Pulses: normal,   Pinprick: Intact  Proprioception: Intact  Vibration (128 Hz): N/A    Impression:   Diagnosis Orders   1. Uncontrolled type 2 diabetes mellitus with hyperglycemia (HCC)  HM DIABETES FOOT EXAM   2. Vitamin D deficiency     3. Late effects of CVA (cerebrovascular accident)     4. Difficulty controlling anger         Plan:  Contine Metformin 1000 mg 1 tablet PO BID. Continue Lipitor 20 mg 1 tablet PO daily  Continue Vitamin D 3 2000 IU 1 cap PO BID  Continue Sertraline  50 mg 1 tablet PO daily. Continue  mg PO daily. Orders Placed This Encounter   Medications    atorvastatin (LIPITOR) 20 MG tablet     Sig: Take 1 tablet by mouth daily     Dispense:  90 tablet     Refill:  1       Follow Up:  Return in about 4 months (around 5/6/2021).     Harlan Camilo MD

## 2021-01-25 ENCOUNTER — NURSE ONLY (OUTPATIENT)
Dept: LAB | Age: 56
End: 2021-01-25

## 2021-01-25 DIAGNOSIS — E55.9 VITAMIN D DEFICIENCY: ICD-10-CM

## 2021-01-25 DIAGNOSIS — E11.9 WELL CONTROLLED TYPE 2 DIABETES MELLITUS (HCC): ICD-10-CM

## 2021-01-25 LAB
ALBUMIN SERPL-MCNC: 4.5 G/DL (ref 3.5–5.1)
ALP BLD-CCNC: 97 U/L (ref 38–126)
ALT SERPL-CCNC: 39 U/L (ref 11–66)
ANION GAP SERPL CALCULATED.3IONS-SCNC: 11 MEQ/L (ref 8–16)
AST SERPL-CCNC: 17 U/L (ref 5–40)
BACTERIA: ABNORMAL
BASOPHILS # BLD: 0.3 %
BASOPHILS ABSOLUTE: 0 THOU/MM3 (ref 0–0.1)
BILIRUB SERPL-MCNC: 0.3 MG/DL (ref 0.3–1.2)
BILIRUBIN URINE: NEGATIVE
BLOOD, URINE: NEGATIVE
BUN BLDV-MCNC: 12 MG/DL (ref 7–22)
CALCIUM SERPL-MCNC: 10.1 MG/DL (ref 8.5–10.5)
CASTS: ABNORMAL /LPF
CASTS: ABNORMAL /LPF
CHARACTER, URINE: CLEAR
CHLORIDE BLD-SCNC: 100 MEQ/L (ref 98–111)
CHOLESTEROL, TOTAL: 133 MG/DL (ref 100–199)
CO2: 29 MEQ/L (ref 23–33)
COLOR: ABNORMAL
CREAT SERPL-MCNC: 0.7 MG/DL (ref 0.4–1.2)
CREATININE, URINE: 219.6 MG/DL
CRYSTALS: ABNORMAL
EOSINOPHIL # BLD: 1.3 %
EOSINOPHILS ABSOLUTE: 0.1 THOU/MM3 (ref 0–0.4)
EPITHELIAL CELLS, UA: ABNORMAL /HPF
ERYTHROCYTE [DISTWIDTH] IN BLOOD BY AUTOMATED COUNT: 12.9 % (ref 11.5–14.5)
ERYTHROCYTE [DISTWIDTH] IN BLOOD BY AUTOMATED COUNT: 43.7 FL (ref 35–45)
GFR SERPL CREATININE-BSD FRML MDRD: > 90 ML/MIN/1.73M2
GLUCOSE BLD-MCNC: 144 MG/DL (ref 70–108)
GLUCOSE, URINE: NEGATIVE MG/DL
HCT VFR BLD CALC: 44 % (ref 42–52)
HDLC SERPL-MCNC: 31 MG/DL
HEMOGLOBIN: 14.2 GM/DL (ref 14–18)
IMMATURE GRANS (ABS): 0.06 THOU/MM3 (ref 0–0.07)
IMMATURE GRANULOCYTES: 0.6 %
KETONES, URINE: ABNORMAL
LDL CHOLESTEROL CALCULATED: 61 MG/DL
LEUKOCYTE ESTERASE, URINE: ABNORMAL
LYMPHOCYTES # BLD: 23.3 %
LYMPHOCYTES ABSOLUTE: 2.4 THOU/MM3 (ref 1–4.8)
MCH RBC QN AUTO: 30.3 PG (ref 26–33)
MCHC RBC AUTO-ENTMCNC: 32.3 GM/DL (ref 32.2–35.5)
MCV RBC AUTO: 94 FL (ref 80–94)
MICROALBUMIN UR-MCNC: 3.65 MG/DL
MICROALBUMIN/CREAT UR-RTO: 17 MG/G (ref 0–30)
MISCELLANEOUS LAB TEST RESULT: ABNORMAL
MONOCYTES # BLD: 5.9 %
MONOCYTES ABSOLUTE: 0.6 THOU/MM3 (ref 0.4–1.3)
NITRITE, URINE: NEGATIVE
NUCLEATED RED BLOOD CELLS: 0 /100 WBC
PH UA: 6 (ref 5–9)
PLATELET # BLD: 297 THOU/MM3 (ref 130–400)
PMV BLD AUTO: 11.3 FL (ref 9.4–12.4)
POTASSIUM SERPL-SCNC: 4.5 MEQ/L (ref 3.5–5.2)
PROTEIN UA: ABNORMAL MG/DL
RBC # BLD: 4.68 MILL/MM3 (ref 4.7–6.1)
RBC URINE: ABNORMAL /HPF
RENAL EPITHELIAL, UA: ABNORMAL
SEG NEUTROPHILS: 68.6 %
SEGMENTED NEUTROPHILS ABSOLUTE COUNT: 7.1 THOU/MM3 (ref 1.8–7.7)
SODIUM BLD-SCNC: 140 MEQ/L (ref 135–145)
SPECIFIC GRAVITY UA: 1.02 (ref 1–1.03)
TOTAL PROTEIN: 7.5 G/DL (ref 6.1–8)
TRIGL SERPL-MCNC: 206 MG/DL (ref 0–199)
UROBILINOGEN, URINE: 1 EU/DL (ref 0–1)
VITAMIN D 25-HYDROXY: 43 NG/ML (ref 30–100)
WBC # BLD: 10.4 THOU/MM3 (ref 4.8–10.8)
WBC UA: ABNORMAL /HPF
YEAST: ABNORMAL

## 2021-01-26 ENCOUNTER — TELEPHONE (OUTPATIENT)
Dept: FAMILY MEDICINE CLINIC | Age: 56
End: 2021-01-26

## 2021-01-26 DIAGNOSIS — R82.90 ABNORMAL URINE: Primary | ICD-10-CM

## 2021-01-26 RX ORDER — SULFAMETHOXAZOLE AND TRIMETHOPRIM 800; 160 MG/1; MG/1
1 TABLET ORAL 2 TIMES DAILY
Qty: 10 TABLET | Refills: 0 | Status: SHIPPED | OUTPATIENT
Start: 2021-01-26 | End: 2021-01-31

## 2021-01-26 NOTE — TELEPHONE ENCOUNTER
Deshaun Sanchez MD   1/26/2021 11:45 AM EST      Vitamin B12 is 43, good level, continue same.  Comprehensive panel was within normal ranges except for glucose was 144.  Lipid profile showed total cholesterol 133, very good.  Triglycerides were 206, higher than last time that was 176.  This can be brought down decreasing processed carbohydrates and juices in the diet.  Good cholesterol 31, still too low may be increased with good fats in the diet including fish, and with physical activity.

## 2021-01-26 NOTE — TELEPHONE ENCOUNTER
Annetta Rangel MD   1/25/2021  6:10 PM EST      Urine showed signs of UTI, unusual in males.  Do PSA and treat with Bactrim DS 1 PO BID for 10 days

## 2021-01-26 NOTE — TELEPHONE ENCOUNTER
Spoke with patient regarding results. Prescription sent to pharm as requested by patient. Order for psa mailed to address on file. No additional questions at this time.

## 2021-02-05 ENCOUNTER — TELEPHONE (OUTPATIENT)
Dept: FAMILY MEDICINE CLINIC | Age: 56
End: 2021-02-05

## 2021-02-05 NOTE — TELEPHONE ENCOUNTER
Wife-Anastasiia called regarding the patient's most recent prescription for a UTI. Patient was given Bactrim. She states the dermatology just prescribed an antibiotic for his skin. Wasn't sure it it was okay to take and have PSA drawn. Informed the wife patient can complete PSA at convenience but recommend to notify the dermatologist office that the patient recently completed a 5 day antibiotic course. Wife verbalized understanding and will contact Derm. No additional questions at this time.

## 2021-02-06 ENCOUNTER — NURSE ONLY (OUTPATIENT)
Dept: LAB | Age: 56
End: 2021-02-06

## 2021-02-06 DIAGNOSIS — R82.90 ABNORMAL URINE: ICD-10-CM

## 2021-02-06 LAB — PROSTATE SPECIFIC ANTIGEN: 0.25 NG/ML (ref 0–1)

## 2021-05-05 ENCOUNTER — OFFICE VISIT (OUTPATIENT)
Dept: FAMILY MEDICINE CLINIC | Age: 56
End: 2021-05-05
Payer: COMMERCIAL

## 2021-05-05 ENCOUNTER — NURSE ONLY (OUTPATIENT)
Dept: LAB | Age: 56
End: 2021-05-05

## 2021-05-05 VITALS
TEMPERATURE: 98.3 F | SYSTOLIC BLOOD PRESSURE: 115 MMHG | WEIGHT: 274 LBS | BODY MASS INDEX: 39.22 KG/M2 | HEART RATE: 76 BPM | HEIGHT: 70 IN | RESPIRATION RATE: 12 BRPM | DIASTOLIC BLOOD PRESSURE: 75 MMHG

## 2021-05-05 DIAGNOSIS — E55.9 VITAMIN D DEFICIENCY: ICD-10-CM

## 2021-05-05 DIAGNOSIS — R45.4 DIFFICULTY CONTROLLING ANGER: ICD-10-CM

## 2021-05-05 DIAGNOSIS — I69.90 LATE EFFECTS OF CVA (CEREBROVASCULAR ACCIDENT): ICD-10-CM

## 2021-05-05 DIAGNOSIS — E11.9 WELL CONTROLLED TYPE 2 DIABETES MELLITUS (HCC): ICD-10-CM

## 2021-05-05 DIAGNOSIS — E11.9 WELL CONTROLLED TYPE 2 DIABETES MELLITUS (HCC): Primary | ICD-10-CM

## 2021-05-05 LAB
ALBUMIN SERPL-MCNC: 4.4 G/DL (ref 3.5–5.1)
ALP BLD-CCNC: 86 U/L (ref 38–126)
ALT SERPL-CCNC: 32 U/L (ref 11–66)
ANION GAP SERPL CALCULATED.3IONS-SCNC: 11 MEQ/L (ref 8–16)
AST SERPL-CCNC: 17 U/L (ref 5–40)
BASOPHILS # BLD: 0.3 %
BASOPHILS ABSOLUTE: 0 THOU/MM3 (ref 0–0.1)
BILIRUB SERPL-MCNC: 0.4 MG/DL (ref 0.3–1.2)
BUN BLDV-MCNC: 10 MG/DL (ref 7–22)
CALCIUM SERPL-MCNC: 9.4 MG/DL (ref 8.5–10.5)
CHLORIDE BLD-SCNC: 100 MEQ/L (ref 98–111)
CHOLESTEROL, TOTAL: 126 MG/DL (ref 100–199)
CO2: 27 MEQ/L (ref 23–33)
CREAT SERPL-MCNC: 0.7 MG/DL (ref 0.4–1.2)
EOSINOPHIL # BLD: 0.9 %
EOSINOPHILS ABSOLUTE: 0.1 THOU/MM3 (ref 0–0.4)
ERYTHROCYTE [DISTWIDTH] IN BLOOD BY AUTOMATED COUNT: 12.4 % (ref 11.5–14.5)
ERYTHROCYTE [DISTWIDTH] IN BLOOD BY AUTOMATED COUNT: 43.3 FL (ref 35–45)
GFR SERPL CREATININE-BSD FRML MDRD: > 90 ML/MIN/1.73M2
GLUCOSE BLD-MCNC: 107 MG/DL (ref 70–108)
HBA1C MFR BLD: 6.6 %
HCT VFR BLD CALC: 45.2 % (ref 42–52)
HDLC SERPL-MCNC: 28 MG/DL
HEMOGLOBIN: 14 GM/DL (ref 14–18)
IMMATURE GRANS (ABS): 0.04 THOU/MM3 (ref 0–0.07)
IMMATURE GRANULOCYTES: 0.4 %
LDL CHOLESTEROL CALCULATED: 61 MG/DL
LYMPHOCYTES # BLD: 22.6 %
LYMPHOCYTES ABSOLUTE: 2.3 THOU/MM3 (ref 1–4.8)
MCH RBC QN AUTO: 29.5 PG (ref 26–33)
MCHC RBC AUTO-ENTMCNC: 31 GM/DL (ref 32.2–35.5)
MCV RBC AUTO: 95.2 FL (ref 80–94)
MONOCYTES # BLD: 5.8 %
MONOCYTES ABSOLUTE: 0.6 THOU/MM3 (ref 0.4–1.3)
NUCLEATED RED BLOOD CELLS: 0 /100 WBC
PLATELET # BLD: 282 THOU/MM3 (ref 130–400)
PMV BLD AUTO: 11.2 FL (ref 9.4–12.4)
POTASSIUM SERPL-SCNC: 4.1 MEQ/L (ref 3.5–5.2)
RBC # BLD: 4.75 MILL/MM3 (ref 4.7–6.1)
SEG NEUTROPHILS: 70 %
SEGMENTED NEUTROPHILS ABSOLUTE COUNT: 7.1 THOU/MM3 (ref 1.8–7.7)
SODIUM BLD-SCNC: 138 MEQ/L (ref 135–145)
TOTAL PROTEIN: 7.4 G/DL (ref 6.1–8)
TRIGL SERPL-MCNC: 183 MG/DL (ref 0–199)
TSH SERPL DL<=0.05 MIU/L-ACNC: 1.92 UIU/ML (ref 0.4–4.2)
VITAMIN B-12: 585 PG/ML (ref 211–911)
VITAMIN D 25-HYDROXY: 41 NG/ML (ref 30–100)
WBC # BLD: 10.1 THOU/MM3 (ref 4.8–10.8)

## 2021-05-05 PROCEDURE — 99214 OFFICE O/P EST MOD 30 MIN: CPT | Performed by: FAMILY MEDICINE

## 2021-05-05 PROCEDURE — 3017F COLORECTAL CA SCREEN DOC REV: CPT | Performed by: FAMILY MEDICINE

## 2021-05-05 PROCEDURE — 1036F TOBACCO NON-USER: CPT | Performed by: FAMILY MEDICINE

## 2021-05-05 PROCEDURE — 3051F HG A1C>EQUAL 7.0%<8.0%: CPT | Performed by: FAMILY MEDICINE

## 2021-05-05 PROCEDURE — G8427 DOCREV CUR MEDS BY ELIG CLIN: HCPCS | Performed by: FAMILY MEDICINE

## 2021-05-05 PROCEDURE — 83036 HEMOGLOBIN GLYCOSYLATED A1C: CPT | Performed by: FAMILY MEDICINE

## 2021-05-05 PROCEDURE — G8417 CALC BMI ABV UP PARAM F/U: HCPCS | Performed by: FAMILY MEDICINE

## 2021-05-05 PROCEDURE — 2022F DILAT RTA XM EVC RTNOPTHY: CPT | Performed by: FAMILY MEDICINE

## 2021-05-05 RX ORDER — ATORVASTATIN CALCIUM 20 MG/1
20 TABLET, FILM COATED ORAL DAILY
Qty: 90 TABLET | Refills: 1 | Status: SHIPPED | OUTPATIENT
Start: 2021-05-05 | End: 2022-01-24 | Stop reason: SDUPTHER

## 2021-05-05 NOTE — PROGRESS NOTES
[address]  [address] [place] [address] [address] [place], [address]  Ph:   [phone]  Fax: [phone]    Provider:  Dr. Siobhan Mariano    Patient:  Louisa Gonzalez  YOB: 1965      Visit Date:  5/5/2021     Reason For Visit:   Chief Complaint   Patient presents with    Follow-up    Diabetes     A1C today in office    Shoulder Pain     Left shoulder pain (previous workers comp injury)     Weight Loss     Patient has lost 19 lbs since January         Estephania Kraft is a 50 y.o. male who comes today to the office for follow up of DM type 2, vitamin D deficiency, obesity and depression. Diabetes:  He is taking Metformin 500 mg 1 tablet PO BID. His HbA1C today was 6.6%. He is up to date on Influenza [id number],Pneumovax, Tdap, Prevnar and hepatitis B x 3 and Covid J&J. He is eating a healthy diet in general, portion control, 3 meals a day with 1 snacks. Last microalbumin was 17  done in January 2021. Last lipid profile January 2021 showed a total cholesterol 133, triglycerides 206, HDL 31 and LDL 61    Vitamin D insufficiency:   He is taking Vitamin D 3 2000 IU 1 capsule PO BID. Last vitamin D was 43 in January 2021. He was diagnosed in March 30, 2015 with a level of 10. Anger episodes:  He is taking Zoloft 50 mg 1 tablet PO daily. He is doing well and deneis any side effects from the medicine. He was started on antidepressants for anger management after he had a stroke in 2014. Right cerebellar stroke:  in March 10, 2014 he had a stroke. He was admitted to [address] [place] [address] for syncope. He deteriorated after admission. MRI of brain showed a subacute ischemic infarction of the right cerebellum. He was transfered to [facility]. He was found to have a fourth ventricle effacement, mass effect, cerebellar tonsil inferior displacement. He underwent a suboccipital craniectomy (3/13/14) with infarcted tissue resection.   Work up at the [facility] in [place] showed a patent foramen ovale with a small septal aneurysm and significant right to left shunting. He underwent successful closure of the PFO using ICE-guided placement of a  25 mm Amplatzer occluder device on 11/17/14. He could not be on Coumadin due to his past CNS issues. Hypercoagulable workup was negative. Since then, he has been on   mg 1 tablet PO daily and Lipitor 20 mg 1 tablet PO daily. He was able to return to work after he had his PFO closed in November 20, 2014        Significant Past Medical History:    Past Medical History:   Diagnosis Date    Diabetes mellitus ([address]     Major depression 12/8/2014    Obesity (BMI 30-39.9) 12/8/2014    PFO with atrial septal aneurysm 2014    Repaired at [address] [place] [address] in November 2014    Stroke [facility]) 3/10/14    Unspecified cerebral artery occlusion with cerebral infarction            Allergies:  is allergic to shellfish-derived products.     Medications:   Current Outpatient Medications   Medication Sig Dispense Refill    atorvastatin (LIPITOR) 20 MG tablet Take 1 tablet by mouth daily 90 tablet 1    sertraline (ZOLOFT) 50 MG tablet Take 1 tablet by mouth daily 90 tablet 1    metFORMIN (GLUCOPHAGE) 1000 MG tablet Take 1 tablet by mouth 2 times daily (with meals) 180 tablet 1    meloxicam (MOBIC) 15 MG tablet Take 15 mg by mouth daily      ACCU-CHEK MULTICLIX LANCETS MISC Use as directed 100 each 3    blood glucose test strips (ASCENSIA AUTODISC VI;ONE TOUCH ULTRA TEST VI) strip 1 each by In Vitro route daily Diagnosis E11.9 100 each 3    Cholecalciferol (VITAMIN D) 2000 units CAPS capsule Take 1 capsule by mouth daily 90 capsule 1    calcium carbonate-vitamin D3 (CALCIUM 600/VITAMIN D) 600-400 MG-UNIT TABS Take 1 tablet by mouth 2 times daily 60 tablet 5    Multiple Vitamins-Minerals (THERAPEUTIC MULTIVITAMIN-MINERALS) tablet Take 1 tablet by mouth daily      Cinnamon 500 MG CAPS Take 1 capsule by mouth 3 times daily (with meals) (Patient taking differently: Take 6 capsules by mouth daily ) 90 capsule 5    Blood Glucose Monitoring Suppl (ONE TOUCH BASIC SYSTEM) W/DEVICE KIT 1 kit by Does not apply route daily E11.9 test daily. 1 kit 0    aspirin 325 MG tablet Take 325 mg by mouth daily       No current facility-administered medications for this visit.         Review of systems:  Review of Systems - History obtained from chart review and the patient  General ROS: negative for - chills, fatigue or fever  Psychological ROS: negative for - anxiety, depression or sleep disturbances  ENT ROS: negative for - headaches, nasal congestion or nasal discharge  Allergy and Immunology ROS: negative for - seasonal allergies  Hematological and Lymphatic ROS: negative for - bruising  Endocrine ROS: negative for - malaise/lethargy, polydipsia/polyuria or temperature intolerance  Respiratory ROS: negative for - cough,  shortness of breath or wheezing  Cardiovascular ROS: negative for - chest pain or edema  Gastrointestinal ROS: negative for - abdominal pain, constipation, diarrhea or nausea/vomiting  Musculoskeletal ROS: positive for - left shouder pain  Neurological ROS: negative for - dizziness  Dermatological ROS: negative for - rash    Physical Examination:  /75 (Site: Right Upper Arm, Position: Sitting, Cuff Size: Large Adult)   Pulse 76   Temp 98.3 °F (36.8 °C) (Temporal)   Resp 12   Ht 5' 10\" (1.778 m)   Wt 274 lb (124.3 kg)   BMI 39.31 kg/m²     General-  Alert and oriented x 3, NAD  HEENT: NC, AT, PERRLA, EOMI, anicteric sclerae  Ears: Normal tympanic membranes bilaterally  Nose: patent, no lesions  Mouth: no lesions, moist mucosas  Neck - supple, no significant adenopathy  Chest - clear to auscultation, no wheezes, rales or rhonchi, symmetric air entry  Heart - normal rate, regular rhythm, normal S1, S2, no murmurs, rubs, clicks or gallops  Abdomen - soft, nontender, nondistended, no masses or organomegaly  Extremities - peripheral pulses normal, no pedal edema, no clubbing or cyanosis  Visual inspection:  Deformity/amputation: absent  Skin lesions/pre-ulcerative calluses: absent  Edema: right- negative, left- negative  Sensory exam:  Monofilament sensation: normal  (minimum of 5 random plantar locations tested, avoiding callused areas - > 1 area with absence of sensation is + for neuropathy)  Plus at least one of the following:  Pulses: normal,   Pinprick: Intact  Proprioception: Intact  Vibration (128 Hz): N/A    Impression:   Diagnosis Orders   1. Well controlled type 2 diabetes mellitus ([facility]  POCT glycosylated hemoglobin (Hb A1C)    Lipid Panel    CBC Auto Differential    Comprehensive Metabolic Panel    TSH without Reflex    Vitamin B12    Urinalysis with Microscopic    HM DIABETES FOOT EXAM   2. Vitamin D deficiency  Vitamin D 25 Hydroxy   3. Late effects of CVA (cerebrovascular accident)     4. Difficulty controlling anger         Plan:  Increase Metformin 1000 mg 1 tablet PO BID. Continue Lipitor 20 mg 1 tablet PO daily  Continue Vitamin D 3 2000 IU 1 cap PO BID  Restart Zoloft 50 mg 1 tablet PO daily. Continue  mg PO daily. Orders Placed This Encounter   Medications    atorvastatin (LIPITOR) 20 MG tablet     Sig: Take 1 tablet by mouth daily     Dispense:  90 tablet     Refill:  1       Follow Up:  Return in about 4 months (around 9/5/2021).     Anastasiia Roca MD

## 2021-09-16 ENCOUNTER — OFFICE VISIT (OUTPATIENT)
Dept: FAMILY MEDICINE CLINIC | Age: 56
End: 2021-09-16
Payer: COMMERCIAL

## 2021-09-16 VITALS
WEIGHT: 290.2 LBS | HEART RATE: 84 BPM | HEIGHT: 70 IN | DIASTOLIC BLOOD PRESSURE: 84 MMHG | BODY MASS INDEX: 41.54 KG/M2 | SYSTOLIC BLOOD PRESSURE: 120 MMHG

## 2021-09-16 DIAGNOSIS — E55.9 VITAMIN D DEFICIENCY: ICD-10-CM

## 2021-09-16 DIAGNOSIS — I69.90 LATE EFFECTS OF CVA (CEREBROVASCULAR ACCIDENT): ICD-10-CM

## 2021-09-16 DIAGNOSIS — Z23 NEED FOR INFLUENZA VACCINATION: ICD-10-CM

## 2021-09-16 DIAGNOSIS — E11.9 WELL CONTROLLED TYPE 2 DIABETES MELLITUS (HCC): Primary | ICD-10-CM

## 2021-09-16 DIAGNOSIS — R45.4 DIFFICULTY CONTROLLING ANGER: ICD-10-CM

## 2021-09-16 LAB — HBA1C MFR BLD: 7 %

## 2021-09-16 PROCEDURE — G8427 DOCREV CUR MEDS BY ELIG CLIN: HCPCS | Performed by: FAMILY MEDICINE

## 2021-09-16 PROCEDURE — 1036F TOBACCO NON-USER: CPT | Performed by: FAMILY MEDICINE

## 2021-09-16 PROCEDURE — G8417 CALC BMI ABV UP PARAM F/U: HCPCS | Performed by: FAMILY MEDICINE

## 2021-09-16 PROCEDURE — 3017F COLORECTAL CA SCREEN DOC REV: CPT | Performed by: FAMILY MEDICINE

## 2021-09-16 PROCEDURE — 3051F HG A1C>EQUAL 7.0%<8.0%: CPT | Performed by: FAMILY MEDICINE

## 2021-09-16 PROCEDURE — 2022F DILAT RTA XM EVC RTNOPTHY: CPT | Performed by: FAMILY MEDICINE

## 2021-09-16 PROCEDURE — 90471 IMMUNIZATION ADMIN: CPT | Performed by: FAMILY MEDICINE

## 2021-09-16 PROCEDURE — 99214 OFFICE O/P EST MOD 30 MIN: CPT | Performed by: FAMILY MEDICINE

## 2021-09-16 PROCEDURE — 90674 CCIIV4 VAC NO PRSV 0.5 ML IM: CPT | Performed by: FAMILY MEDICINE

## 2021-09-16 PROCEDURE — 83036 HEMOGLOBIN GLYCOSYLATED A1C: CPT | Performed by: FAMILY MEDICINE

## 2021-09-16 RX ORDER — LIRAGLUTIDE 6 MG/ML
INJECTION SUBCUTANEOUS
Qty: 5 PEN | Refills: 5 | Status: SHIPPED | OUTPATIENT
Start: 2021-09-16 | End: 2022-06-20

## 2021-09-16 SDOH — ECONOMIC STABILITY: FOOD INSECURITY: WITHIN THE PAST 12 MONTHS, YOU WORRIED THAT YOUR FOOD WOULD RUN OUT BEFORE YOU GOT MONEY TO BUY MORE.: NEVER TRUE

## 2021-09-16 SDOH — ECONOMIC STABILITY: FOOD INSECURITY: WITHIN THE PAST 12 MONTHS, THE FOOD YOU BOUGHT JUST DIDN'T LAST AND YOU DIDN'T HAVE MONEY TO GET MORE.: NEVER TRUE

## 2021-09-16 ASSESSMENT — SOCIAL DETERMINANTS OF HEALTH (SDOH): HOW HARD IS IT FOR YOU TO PAY FOR THE VERY BASICS LIKE FOOD, HOUSING, MEDICAL CARE, AND HEATING?: NOT HARD AT ALL

## 2021-09-16 NOTE — PROGRESS NOTES
[address]  [address] [place], [address]  Ph:   [phone]  Fax: [phone]    Provider:  Dr. Kali Sultana    Patient:  Kendal Cintron  YOB: 1965      Visit Date:  9/16/2021     Reason For Visit:   Chief Complaint   Patient presents with    Follow-up     diabetes        Bon Collins is a 49 y.o. male who comes today to the office for follow up of DM type 2, vitamin D deficiency, obesity and depression. Diabetes:  He is taking Metformin 1000 mg 1 tablet PO BID. His HbA1C today was 7.0% from 6.6% last visit. He is up to date Pneumovax, Tdap, Prevnar and hepatitis B x 3 and Covid J&J. He is eating a healthy diet in general, portion control, 3 meals a day with 1 snacks. Last microalbumin was 17  done in January 2021. Last lipid profile January 2021 showed a total cholesterol 133, triglycerides 206, HDL 31 and LDL 61. He is taking Lipitor 20 mg 1 tablet mouth daily. Vitamin D insufficiency:   He is taking vitamin D3 2000 international units 1 capsule in the morning and 1 in the afternoon. Last vitamin D level was 41 in May 5, 2021. He was diagnosed in March 30, 2015 with a level of 10. Anger episodes:  He is taking Zoloft 50 mg 1 tablet PO daily. He is doing well and deneis any side effects from the medicine. He was started on antidepressants for anger management after he had a stroke in 2014. Right cerebellar stroke:  in March 10, 2014 he had a stroke. He was admitted to [address] [place] [address] for syncope. He deteriorated after admission. MRI of brain showed a subacute ischemic infarction of the right cerebellum. He was transfered to [address] [place] [address]. He was found to have a fourth ventricle effacement, mass effect, cerebellar tonsil inferior displacement. He underwent a suboccipital craniectomy (3/13/14) with infarcted tissue resection.   Work up at the [facility] showed a patent foramen ovale with a small septal aneurysm and significant right to left shunting. He underwent successful closure of the PFO using ICE-guided placement of a  25 mm Amplatzer occluder device on 11/17/14. He could not be on Coumadin due to his past CNS issues. Hypercoagulable workup was negative. Since then, he has been on   mg 1 tablet PO daily and Lipitor 20 mg 1 tablet PO daily. He was able to return to work after he had his PFO closed in November 20, 2014        Significant Past Medical History:    Past Medical History:   Diagnosis Date    Diabetes mellitus ([address]     Major depression 12/8/2014    Obesity (BMI 30-39.9) 12/8/2014    PFO with atrial septal aneurysm 2014    Repaired at [facility] in November 2014    Stroke [facility]) 3/10/14    Unspecified cerebral artery occlusion with cerebral infarction            Allergies:  is allergic to shellfish-derived products.     Medications:   Current Outpatient Medications   Medication Sig Dispense Refill    Liraglutide (VICTOZA) 18 MG/3ML SOPN SC injection Inject 0.6 mg subcu daily for 1 week, then 1.2 mg subcu daily for a week, then 1.8 mg subcu daily 5 pen 5    atorvastatin (LIPITOR) 20 MG tablet Take 1 tablet by mouth daily 90 tablet 1    sertraline (ZOLOFT) 50 MG tablet Take 1 tablet by mouth daily 90 tablet 1    metFORMIN (GLUCOPHAGE) 1000 MG tablet Take 1 tablet by mouth 2 times daily (with meals) 180 tablet 1    meloxicam (MOBIC) 15 MG tablet Take 15 mg by mouth daily      ACCU-CHEK MULTICLIX LANCETS MISC Use as directed 100 each 3    blood glucose test strips (ASCENSIA AUTODISC VI;ONE TOUCH ULTRA TEST VI) strip 1 each by In Vitro route daily Diagnosis E11.9 100 each 3    Cholecalciferol (VITAMIN D) 2000 units CAPS capsule Take 1 capsule by mouth daily 90 capsule 1    calcium carbonate-vitamin D3 (CALCIUM 600/VITAMIN D) 600-400 MG-UNIT TABS Take 1 tablet by mouth 2 times daily 60 tablet 5    Multiple Vitamins-Minerals (THERAPEUTIC MULTIVITAMIN-MINERALS) tablet Take 1 tablet by mouth daily      Cinnamon 500 MG CAPS Take 1 capsule by mouth 3 times daily (with meals) (Patient taking differently: Take 6 capsules by mouth daily ) 90 capsule 5    Blood Glucose Monitoring Suppl (ONE TOUCH BASIC SYSTEM) W/DEVICE KIT 1 kit by Does not apply route daily E11.9 test daily. 1 kit 0    aspirin 325 MG tablet Take 325 mg by mouth daily       No current facility-administered medications for this visit.        Review of systems:  Review of Systems - History obtained from chart review and the patient  General ROS: negative for - chills, fatigue or fever  Psychological ROS: negative for - anxiety, depression or sleep disturbances  ENT ROS: negative for - headaches, nasal congestion or nasal discharge  Allergy and Immunology ROS: negative for - seasonal allergies  Hematological and Lymphatic ROS: negative for - bruising  Endocrine ROS: negative for - malaise/lethargy, polydipsia/polyuria or temperature intolerance  Respiratory ROS: negative for - cough,  shortness of breath or wheezing  Cardiovascular ROS: negative for - chest pain or edema  Gastrointestinal ROS: negative for - abdominal pain, constipation, diarrhea or nausea/vomiting  Musculoskeletal ROS: positive for - left shouder pain  Neurological ROS: negative for - dizziness  Dermatological ROS: negative for - rash    Physical Examination:  /84   Pulse 84   Ht 5' 10\" (1.778 m)   Wt 290 lb 3.2 oz (131.6 kg)   BMI 41.64 kg/m²     General-  Alert and oriented x 3, NAD  HEENT: NC, AT, PERRLA, EOMI, anicteric sclerae  Ears: Normal tympanic membranes bilaterally  Nose: patent, no lesions  Mouth: no lesions, moist mucosas  Neck - supple, no significant adenopathy  Chest - clear to auscultation, no wheezes, rales or rhonchi, symmetric air entry  Heart - normal rate, regular rhythm, normal S1, S2, no murmurs, rubs, clicks or gallops  Abdomen - soft, nontender, nondistended, no masses or organomegaly  Extremities - peripheral pulses normal, no pedal edema, no clubbing or

## 2021-09-16 NOTE — PROGRESS NOTES
Immunizations Administered     Name Date Dose Route    Influenza, MDCK Quadv, IM, PF (Flucelvax 2 yrs and older) 9/16/2021 0.5 mL Intramuscular    Site: Deltoid- Right    Lot: [id number]    NDC: 57583-164-02      Patient tolerated well.  No adverse reaction noted at this time

## 2021-10-25 NOTE — TELEPHONE ENCOUNTER
Please approve or deny     Last Visit Date:  9/16/2021       Next Visit Date:    Visit date not found

## 2021-12-15 ENCOUNTER — OFFICE VISIT (OUTPATIENT)
Dept: FAMILY MEDICINE CLINIC | Age: 56
End: 2021-12-15
Payer: COMMERCIAL

## 2021-12-15 VITALS
HEART RATE: 77 BPM | BODY MASS INDEX: 41.46 KG/M2 | DIASTOLIC BLOOD PRESSURE: 76 MMHG | SYSTOLIC BLOOD PRESSURE: 139 MMHG | HEIGHT: 70 IN | WEIGHT: 289.6 LBS

## 2021-12-15 DIAGNOSIS — Z11.59 NEED FOR HEPATITIS C SCREENING TEST: ICD-10-CM

## 2021-12-15 DIAGNOSIS — Z87.891 PERSONAL HISTORY OF TOBACCO USE: ICD-10-CM

## 2021-12-15 DIAGNOSIS — Z00.00 WELLNESS EXAMINATION: Primary | ICD-10-CM

## 2021-12-15 DIAGNOSIS — E11.65 UNCONTROLLED TYPE 2 DIABETES MELLITUS WITH HYPERGLYCEMIA (HCC): ICD-10-CM

## 2021-12-15 DIAGNOSIS — Z00.00 ENCOUNTER FOR WELL ADULT EXAM WITHOUT ABNORMAL FINDINGS: ICD-10-CM

## 2021-12-15 DIAGNOSIS — Z71.89 LIVING WILL, COUNSELING/DISCUSSION: ICD-10-CM

## 2021-12-15 DIAGNOSIS — L30.9 ECZEMA OF BOTH HANDS: ICD-10-CM

## 2021-12-15 PROCEDURE — G8482 FLU IMMUNIZE ORDER/ADMIN: HCPCS | Performed by: FAMILY MEDICINE

## 2021-12-15 PROCEDURE — 99396 PREV VISIT EST AGE 40-64: CPT | Performed by: FAMILY MEDICINE

## 2021-12-15 PROCEDURE — G0296 VISIT TO DETERM LDCT ELIG: HCPCS | Performed by: FAMILY MEDICINE

## 2021-12-15 NOTE — PATIENT INSTRUCTIONS
Learning About Calcium  What is calcium? Calcium keeps your bones and musclesincluding your hearthealthy and strong. Your body needs vitamin D to absorb calcium. People who don't get enough calcium and vitamin D throughout life have an increased chance of having thin and brittle bones (osteoporosis) in their later years. Thin and brittle bones break easily. They can lead to serious injuries. This is why it's important for you to get enough calcium and vitamin D as a child and as an adult. It helps keep your bones strong as you get older. And it protects you against possible breaks. Your body also uses vitamin D to help your muscles absorb calcium and work well. If your muscles don't get enough calcium, then they can cramp, hurt, or feel weak. You may have long-term (chronic) muscle aches and pains. How much calcium do you need? How much calcium you need each day changes as you age. Here are the recommended daily allowances (RDAs) for calcium:  · Ages 1 to 3 years: 700 milligrams  · Ages 4 to 8 years: 1,000 milligrams  · Ages 5 to 25 years: 1,300 milligrams  · Ages 23 to 48 years: 1,000 milligrams  · Males 46 to 79 years: 1,000 milligrams  · Females 46 to 79 years: 1,200 milligrams  · Ages 70 and older: 1,200 milligrams  Women who are pregnant or breastfeeding need the same amount of calcium and vitamin D as other women their age. How can you get enough calcium? Calcium is in foods such as milk, cheese, and yogurt. Vegetables like broccoli, kale, and Chinese cabbage also have it. You can get calcium if you eat the soft edible bones in canned sardines and canned salmon. Foods with added (fortified) calcium include some cereals, juices, soy drinks, and tofu. The food label will show how much of it was added. You can figure out how much calcium is in a food by looking at the percent daily value section on the nutrition facts label. The food label assumes the daily value of calcium is 1,300 mg.  So if one serving of a food has a daily value of 20% of calcium, that food has 260 mg of calcium in one serving. Some people who don't get enough calcium may need supplements. Two common calcium supplements are calcium citrate and calcium carbonate. Calcium carbonate is best absorbed when it is taken with food. Calcium citrate can be absorbed well with or without food. Spreading calcium out over the course of the day can reduce stomach upset. And your body absorbs it better when it is spread over the day. Try not to take more than 500 mg of calcium supplement at a time. Where can you learn more? Go to https://Academizepepiceweb.Volusion. org and sign in to your StackBlaze account. Enter S264 in the Register My InfoÂ® box to learn more about \"Learning About Calcium. \"     If you do not have an account, please click on the \"Sign Up Now\" link. Current as of: December 17, 2020               Content Version: 12.9  © 2006-2021 [employer]. Care instructions adapted under license by [facility]. If you have questions about a medical condition or this instruction, always ask your healthcare professional. [place] [address] any warranty or liability for your use of this information. Learning About High-Calcium Foods  What foods are high in calcium? The foods you eat contain nutrients, such as vitamins and minerals. Calcium is a nutrient. Your body needs the right amount to stay healthy and work as it should. You can use the list below to help you make choices about which foods to eat. Here are some foods that contain calcium.   Fruits  · Figs, dried  · Orange juice, fortified with calcium  Vegetables  · Bok jordy  · Broccoli  · Fabricio greens  · Kale  Grains  · Cereals, fortified with calcium  Dairy and dairy alternatives  · Cheese  · Milk  · Non-dairy milk (almond, rice, soy), fortified with calcium  · Yogurt  Protein foods  · Almonds  · [place] or sardines, canned with bones  · Soybeans  · Tofu, fortified with calcium  Work with your doctor to find out how much of this nutrient you need. Depending on your health, you may need more or less of it in your diet. Current as of: December 17, 2020               Content Version: 12.9  © 2006-2021 Healthwise, Incorporated. Care instructions adapted under license by [facility] ([facility]). If you have questions about a medical condition or this instruction, always ask your healthcare professional. [address] any warranty or liability for your use of this information. Learning About Vitamin D  Why is it important to get enough vitamin D? Your body needs vitamin D to absorb calcium. Calcium keeps your bones and muscles, including your heart, healthy and strong. If your muscles don't get enough calcium, they can cramp, hurt, or feel weak. You may have long-term (chronic) muscle aches and pains. If you don't get enough vitamin D throughout life, you have an increased chance of having thin and brittle bones (osteoporosis) in your later years. Children who don't get enough vitamin D may not grow as much as others their age. They also have a chance of getting a rare disease called rickets. It causes weak bones. Vitamin D and calcium are added to many foods. And your body uses sunshine to make its own vitamin D. How much vitamin D do you need? The recommended daily allowance (RDA) for vitamin D is 600 IU (international units) every day for people ages 3 through 79. Adults 71 and older need 800 IU every day. Blood tests for vitamin D can check your vitamin D level. But there is no standard normal range used by all laboratories. You're likely getting enough vitamin D if your levels are in the range of 20 to 50 ng/mL. How can you get more vitamin D? Foods that contain vitamin D include:  · [place], tuna, and mackerel.  These are some of the best foods to eat when you need to get more vitamin D.  · Cheese, egg yolks, and beef liver. These foods have vitamin D in small amounts. · Milk, soy drinks, orange juice, yogurt, margarine, and some kinds of cereal have vitamin D added to them. Some people don't make vitamin D as well as others. They may have to take extra care in getting enough vitamin D. Things that reduce how much vitamin D your body makes include:  · Dark skin, such as many  Americans have. · Age, especially if you are older than 72. · Digestive problems, such as Crohn's or celiac disease. · Liver and kidney disease. Some people who do not get enough vitamin D may need supplements. Are there any risks from taking vitamin D? 1. Too much vitamin D:  ? Can damage your kidneys. ? Can cause nausea and vomiting, constipation, and weakness. ? Raises the amount of calcium in your blood. If this happens, you can get confused or have an irregular heart rhythm. 2. Vitamin D may interact with other medicines. Tell your doctor about all of the medicines you take, including over-the-counter drugs, herbs, and pills. Tell your doctor about all of your current medical problems. Where can you learn more? Go to https://InfobrightpeSignum Biosciences.MJJ Sales. org and sign in to your [employer] account. Enter [id number] in the [facility] box to learn more about \"Learning About Vitamin D. \"     If you do not have an account, please click on the \"Sign Up Now\" link. Current as of: December 17, 2020               Content Version: 12.9  © 2006-2021 [facility]. Care instructions adapted under license by [facility] ([facility]). If you have questions about a medical condition or this instruction, always ask your healthcare professional. [place] [address] any warranty or liability for your use of this information. Back Stretches: Exercises  Introduction  Here are some examples of exercises for stretching your back. Start each exercise slowly. Ease off the exercise if you start to have pain.   Your doctor or physical therapist will tell you when you can start these exercises and which ones will work best for you. How to do the exercises      Overhead stretch   1. Stand comfortably with your feet shoulder-width apart. 2. Looking straight ahead, raise both arms over your head and reach toward the ceiling. Do not allow your head to tilt back. 3. Hold for 15 to 30 seconds, then lower your arms to your sides. 4. Repeat 2 to 4 times. Side stretch   1. Stand comfortably with your feet shoulder-width apart. 2. Raise one arm over your head, and then lean to the other side. 3. Slide your hand down your leg as you let the weight of your arm gently stretch your side muscles. Hold for 15 to 30 seconds. 4. Repeat 2 to 4 times on each side. Press-up   1. Lie on your stomach, supporting your body with your forearms. 2. Press your elbows down into the floor to raise your upper back. As you do this, relax your stomach muscles and allow your back to arch without using your back muscles. As your press up, do not let your hips or pelvis come off the floor. 3. Hold for 15 to 30 seconds, then relax. 4. Repeat 2 to 4 times. Relax and rest   1. Lie on your back with a rolled towel under your neck and a pillow under your knees. Extend your arms comfortably to your sides. 2. Relax and breathe normally. 3. Remain in this position for about 10 minutes. 4. If you can, do this 2 or 3 times each day. Follow-up care is a key part of your treatment and safety. Be sure to make and go to all appointments, and call your doctor if you are having problems. It's also a good idea to know your test results and keep a list of the medicines you take. Where can you learn more? Go to https://WorklightpeExpand Beyond.VNY Global Innovations. org and sign in to your Smailex account. Enter R286 in the [employer]ur[employer] box to learn more about \"Back Stretches: Exercises. \"     If you do not have an account, please click on the \"Sign Up Now\" hallway outside sleeping areas, and inside each bedroom. ? In the center of a ceiling, or on a wall 6 to 12 inches from the ceiling. This is where smoke goes first. Avoid places near doors, windows, or air ducts. 5. Put a carbon monoxide alarm in the hallway outside of the bedrooms in each sleeping area of the house. The alarm should be placed high on the wall. Make sure that the alarm can't be covered up by furniture or drapes. 6. Make sure your safety alarms are working at all times. You can test them every month by pressing the test button. 7. If an alarm makes a chirping sound, replace the battery right away. 8. Replace non-lithium batteries twice a year. Put this on your calendar ahead of time. Some people change alarm batteries when they reset their clocks in the spring and fall. 9. Replace smoke alarms every 10 years. 10. Plan and practice fire escape routes. Make sure you have at least two for each area of your home. This includes upper stories and the basement. When should you call for help? Call 911 anytime you think you may need emergency care. For example, call if:    · A smoke or carbon monoxide alarm sounds. Tell everyone to get out of the building. Stand outside until firefighters arrive. Watch closely for changes in your health, and be sure to contact your doctor if you have questions about how to use a home safety alarm. Where can you learn more? Go to https://Babelgumpepiceweb.Reds10. org and sign in to your [employer] account. Enter M888 in the [facility] box to learn more about \"Home Safety Alarms: Care Instructions. \"     If you do not have an account, please click on the \"Sign Up Now\" link. Current as of: February 18, 2021               Content Version: 12.9  © 2006-2021 Healthwise, Incorporated. Care instructions adapted under license by [facility] [employer] [facility] ([facility]).  If you have questions about a medical condition or this instruction, always ask your healthcare professional. [employer], [facility] disclaims any warranty or liability for your use of this information. Learning About Getting In and Out of a Bathtub Safely  Introduction  Many falls happen during bathing. All that water makes the bathroom a slippery place. · You may no longer be able to step over the tub wall comfortably and safely. · You might lean on things that aren't meant to support your weight, like a towel bar or the shower curtain. There are several types of aids that will help keep you safe. You can buy them at [employer] or home improvement stores or online. What tools can you use to get in and out of the tub? Tub rail and grab bar   There are many types of bars and rails you can install on the walls next to your tub or on the edge of the tub. They will help keep you safe while you're getting in or out of the tub. It's important to have them permanently installed, rather than attached with suction. Transfer bench   There are several kinds of benches or seats to use in the bathtub. One type sits in the tub and extends out over the side. You sit on the bench. Lift one leg at a time into the tub, sliding your body over as you do so. Another common tub bench sits inside the tub. To use this type you need to be able to safely step into the tub before sitting down. Nonskid mats   Water makes both the floor of the tub and the bathroom floor slippery. You can buy adhesive strips that stick to the floor of the tub. Or you can use a nonskid tub mat. Outside the tub, make sure you use a rug with a nonskid bottom. Don't use a plain towel. Hand-held shower faucet   With a hand-held faucet, you can get clean without having to lower yourself into the tub. Hang a shower curtain to keep water from spilling out onto the floor. Follow-up care is a key part of your treatment and safety. Be sure to make and go to all appointments, and call your doctor if you are having problems.  It's also a good idea to know your test results and keep a list of the medicines you take. Where can you learn more? Go to https://chpepiceweb.Solasta. org and sign in to your [facility] account. Enter C824 in the [facility] box to learn more about \"Learning About Getting In and Out of a Bathtub Safely. \"     If you do not have an account, please click on the \"Sign Up Now\" link. Current as of: November 16, 2020               Content Version: 12.9  © 2006-2021 Healthwise, [employer]. Care instructions adapted under license by [facility] ([facility]). If you have questions about a medical condition or this instruction, always ask your healthcare professional. [place] [address] any warranty or liability for your use of this information. Learning About Getting In and Out of a Car Safely  Introduction  Most people get into a car by stooping to move sideways through the door. They put in one leg, sit down, and then bring in the other leg. But if you have problems with mobility or balance, getting into a car this way might be difficult or unsafe. It's easier and safer to sit down in the car first, and then move your legs into the car after you're seated. And if the ground is slick or icy, this method is safer for everyone. Try this:  5. When you get to the door, turn around so that you're facing away from the car. Reach back to hold on to something stable, such as the seat or the door frame. 6. Sit down so that you are sitting sideways on the seat. Be careful not to hit your head on the top of the door jamb. 7. Slide around so that you're facing front, and lift your first leg in. Then lift your second leg in. To get out of the car, do the same steps in reverse order:  5. When the door is open, lift your first leg out the door and put your foot on the ground. 6. As you do the same with your second leg, slide around so you are facing out the door.   7. Use the seat or door frame for support as you lean forward and push yourself up to a standing position. If your car seat has fabric upholstery, you might find that it's hard to slide around. Try covering the seat with something to make it easier to slide on, like a piece of plastic or vinyl. Make sure it doesn't get in the way of your seat belt. If you still have trouble, ask your physical therapist or occupational therapist to show you the best way to get in and out of a car. He or she can also tell you about tools that can make this easier for you. What tools can help you get in and out of a car safely? Grab bar and door strap   5. There are several types of handholds that you can install on the frame of your car door or beside the door. 6. They can give you something to hold on to as you get in and out of the car seat. Swivel seat   5. A swivel seat is like a lazy [place] Reek or a turntable. You sit down facing sideways and then use it to turn forward as you pull your legs in. Seat belt extender   1. You may have a hard time dealing with a normal seat belt. An extension may help you find and reach the end of the belt more easily. Follow-up care is a key part of your treatment and safety. Be sure to make and go to all appointments, and call your doctor if you are having problems. It's also a good idea to know your test results and keep a list of the medicines you take. Where can you learn more? Go to https://SolsticepeCintric.[employer]. org and sign in to your gridComm account. Enter P848 in the [facility] box to learn more about \"Learning About Getting In and Out of a Car Safely. \"     If you do not have an account, please click on the \"Sign Up Now\" link. Current as of: November 16, 2020               Content Version: 12.9  © 0551-8586 Healthwise, Incorporated. Care instructions adapted under license by [facility] ([facility]).  If you have questions about a medical condition or this instruction, always ask your healthcare professional. Anne Ferguson, chips, and frozen treats can still be high in sugar and calories. Some fat-free foods have more calories than regular ones. Eat fat-free treats in moderation, as you would other foods. If your favorite foods are high in fat, salt, sugar, or calories, limit how often you eat them. Eat smaller servings, or look for healthy substitutes. Fill up on fruits, vegetables, and whole grains. Eating at home  · Use meat as a side dish instead of as the main part of your meal.  · Try main dishes that use whole wheat pasta, brown rice, dried beans, or vegetables. · Find ways to cook with little or no fat, such as broiling, steaming, or grilling. · Use cooking spray instead of oil. If you use oil, use a monounsaturated oil, such as canola or olive oil. · Trim fat from meats before you cook them. · Drain off fat after you brown the meat or while you roast it. · Chill soups and stews after you cook them. Then skim the fat off the top after it hardens. Eating out  · Order foods that are broiled or poached rather than fried or breaded. · Cut back on the amount of butter or margarine that you use on bread. · Order sauces, gravies, and salad dressings on the side, and use only a little. · When you order pasta, choose tomato sauce rather than cream sauce. · Ask for salsa with your baked potato instead of sour cream, butter, cheese, or quarles. · Order meals in a small size instead of upgrading to a large. · Share an entree, or take part of your food home to eat as another meal.  · Share appetizers and desserts. Where can you learn more? Go to https://Origami EnergypeBomboard.China Talent Group. org and sign in to your Pockit account. Enter F188 in the AquaBlok box to learn more about \"Learning About Cutting Calories. \"     If you do not have an account, please click on the \"Sign Up Now\" link. Current as of: December 17, 2020               Content Version: 13.0  © 2384-2417 Healthwise, Incorporated.    Care instructions adapted under license by [employer]. If you have questions about a medical condition or this instruction, always ask your healthcare professional. [address] any warranty or liability for your use of this information. Learning About Low-Carbohydrate Diets  What is a low-carbohydrate diet? A low-carbohydrate (or \"low-carb\") diet limits foods and drinks that have carbohydrates. This includes grains, fruits, milk and yogurt, and starchy vegetables like potatoes, beans, and corn. It also avoids foods and drinks that have added sugar. Instead, low-carb diets include foods that are high in protein and fat. Why might you follow a low-carb diet? Low-carb diets may be used for a variety of reasons, such as for weight loss. People who have diabetes may use a low-carb diet to help manage their blood sugar levels. What should you do before you start the diet? Talk to your doctor before you try any diet. This is even more important if you have health problems like kidney disease, heart disease, or diabetes. Your doctor may suggest that you meet with a registered dietitian. A dietitian can help you make an eating plan that works for you. What foods do you eat on a low-carb diet? On a low-carb diet, you choose foods that are high in protein and fat. Examples of these are:  · Meat, poultry, and fish. · Eggs. · Nuts, such as walnuts, pecans, almonds, and peanuts. · Peanut butter and other nut butters. · Tofu. · Avocado. · Cheril Pluck. · Non-starchy vegetables like broccoli, cauliflower, green beans, mushrooms, peppers, lettuce, and spinach. · Unsweetened non-dairy milks like almond milk and coconut milk. · Cheese, cottage cheese, and cream cheese. Current as of: December 17, 2020               Content Version: 13.0  © 2006-2021 Healthwise, Incorporated. Care instructions adapted under license by [employer].  If you have questions about a medical condition or this instruction, always ask your healthcare professional. [place] [address] any warranty or liability for your use of this information. What is lung cancer screening? Lung cancer screening is a way in which doctors check the lungs for early signs of cancer in people who have no symptoms of lung cancer. A low-dose CT scan uses much less radiation than a normal CT scan and shows a more detailed image of the lungs than a standard X-ray. The goal of lung cancer screening is to find cancer early, before it has a chance to grow, spread, or cause problems. One large study found that smokers who were screened with low-dose CT scans were less likely to die of lung cancer than those who were screened with standard X-ray. Below is a summary of the things you need to know regarding screening for lung cancer with low-dose computed tomography (LDCT). This is a screening program that involves routine annual screening with LDCT studies of the lung. The LDCTs are done using low-dose radiation that is not thought to increase your cancer risk. If you have other serious medical conditions (other cancers, congestive heart failure) that limit your life expectancy to less than 10 years, you should not undergo lung cancer screening with LDCT. The chance is 20%-60% that the LDCT result will show abnormalities. This would require additional testing which could include repeat imaging or even invasive procedures. Most (about 95%) of \"abnormal\" LDCT results are false in the sense that no lung cancer is ultimately found. Additionally, some (about 10%) of the cancers found would not affect your life expectancy, even if undetected and untreated. If you are still smoking, the single most important thing that you can do to reduce your risk of dying of lung cancer is to quit. For this screening to be covered by Medicare and most other insurers, strict criteria must be met.   If you do not meet these criteria, but still wish to undergo LDCT testing, you will be required to sign a waiver indicating your willingness to pay for the scan. Well Visit, Men 48 to 72: Care Instructions  Overview     Well visits can help you stay healthy. Your doctor has checked your overall health and may have suggested ways to take good care of yourself. Your doctor also may have recommended tests. At home, you can help prevent illness with healthy eating, regular exercise, and other steps. Follow-up care is a key part of your treatment and safety. Be sure to make and go to all appointments, and call your doctor if you are having problems. It's also a good idea to know your test results and keep a list of the medicines you take. How can you care for yourself at home? · Get screening tests that you and your doctor decide on. Screening helps find diseases before any symptoms appear. · Eat healthy foods. Choose fruits, vegetables, whole grains, protein, and low-fat dairy foods. Limit fat, especially saturated fat. Reduce salt in your diet. · Limit alcohol. Have no more than 2 drinks a day or 14 drinks a week. · Get at least 30 minutes of exercise on most days of the week. Walking is a good choice. You also may want to do other activities, such as running, swimming, cycling, or playing tennis or team sports. · Reach and stay at a healthy weight. This will lower your risk for many problems, such as obesity, diabetes, heart disease, and high blood pressure. · Do not smoke. Smoking can make health problems worse. If you need help quitting, talk to your doctor about stop-smoking programs and medicines. These can increase your chances of quitting for good. · Care for your mental health. It is easy to get weighed down by worry and stress. Learn strategies to manage stress, like deep breathing and mindfulness, and stay connected with your family and community. If you find you often feel sad or hopeless, talk with your doctor. Treatment can help.   · Talk to your doctor about whether you have any risk factors for sexually transmitted infections (STIs). You can help prevent STIs if you wait to have sex with a new partner (or partners) until you've each been tested for STIs. It also helps if you use condoms (male or female condoms) and if you limit your sex partners to one person who only has sex with you. Vaccines are available for some STIs. · If it's important to you to prevent pregnancy with your partner, talk with your doctor about birth control options that might be best for you. · If you think you may have a problem with alcohol or drug use, talk to your doctor. This includes prescription medicines (such as amphetamines and opioids) and illegal drugs (such as cocaine and methamphetamine). Your doctor can help you figure out what type of treatment is best for you. · Protect your skin from too much sun. When you're outdoors from 10 a.m. to 4 p.m., stay in the shade or cover up with clothing and a hat with a wide brim. Wear sunglasses that block UV rays. Even when it's cloudy, put broad-spectrum sunscreen (SPF 30 or higher) on any exposed skin. · See a dentist one or two times a year for checkups and to have your teeth cleaned. · Wear a seat belt in the car. When should you call for help? Watch closely for changes in your health, and be sure to contact your doctor if you have any problems or symptoms that concern you. Where can you learn more? Go to https://AkesoGenXnahid.health-partners. org and sign in to your Dome9 Security account. Enter W037 in the [facility] box to learn more about \"Well Visit, Men 48 to 72: Care Instructions. \"     If you do not have an account, please click on the \"Sign Up Now\" link. Current as of: February 11, 2021               Content Version: 13.0  © 7294-2634 Healthwise, Incorporated. Care instructions adapted under license by [facility] ([facility]).  If you have questions about a medical condition or this instruction, always ask your Corey Gibson also find a number of online support groups. · Encourage yourself. When you feel like giving up, don't waste energy feeling bad about yourself. Remember your reason for wanting to change, think about the progress you've made, and give yourself a pep talk and a pat on the back. · Get professional help. A dietitian can help you make your diet healthier while still allowing you to eat foods that you enjoy. A  or physical therapist can help design an exercise program that is fun and easy to stay on. A counselor, a , or your doctor can help you overcome hurdles, reduce stress, or quit smoking. Where can you learn more? Go to https://PacketVideopeSmuleeb.MCT Danismanlik AS (MCTAS: [place]). org and sign in to your Tapru account. Enter L558 in the [employer] box to learn more about \"Learning About Changing a Habit by Setting Goals. \"     If you do not have an account, please click on the \"Sign Up Now\" link. Current as of: June 16, 2021               Content Version: 13.0  © 7968-8815 Healthwise, [employer]. Care instructions adapted under license by [facility] If you have questions about a medical condition or this instruction, always ask your healthcare professional. [address] any warranty or liability for your use of this information. A Healthy Lifestyle: Care Instructions  Your Care Instructions     A healthy lifestyle can help you feel good, stay at a healthy weight, and have plenty of energy for both work and play. A healthy lifestyle is something you can share with your whole family. A healthy lifestyle also can lower your risk for serious health problems, such as high blood pressure, heart disease, and diabetes. You can follow a few steps listed below to improve your health and the health of your family. Follow-up care is a key part of your treatment and safety.  Be sure to make and go to all appointments, and call your doctor if you are having problems. It's also a good idea to know your test results and keep a list of the medicines you take. How can you care for yourself at home? · Do not eat too much sugar, fat, or fast foods. You can still have dessert and treats now and then. The goal is moderation. · Start small to improve your eating habits. Pay attention to portion sizes, drink less juice and soda pop, and eat more fruits and vegetables. ? Eat a healthy amount of food. A 3-ounce serving of meat, for example, is about the size of a deck of cards. Fill the rest of your plate with vegetables and whole grains. ? Limit the amount of soda and sports drinks you have every day. Drink more water when you are thirsty. ? Eat plenty of fruits and vegetables every day. Have an apple or some carrot sticks as an afternoon snack instead of a candy bar. Try to have fruits and/or vegetables at every meal.  · Make exercise part of your daily routine. You may want to start with simple activities, such as walking, bicycling, or slow swimming. Try to be active 30 to 60 minutes every day. You do not need to do all 30 to 60 minutes all at once. For example, you can exercise 3 times a day for 10 or 20 minutes. Moderate exercise is safe for most people, but it is always a good idea to talk to your doctor before starting an exercise program.  · Keep moving. Irasema Otero the lawn, work in the garden, or wufoo. Take the stairs instead of the elevator at work. · If you smoke, quit. People who smoke have an increased risk for heart attack, stroke, cancer, and other lung illnesses. Quitting is hard, but there are ways to boost your chance of quitting tobacco for good. ? Use nicotine gum, patches, or lozenges. ? Ask your doctor about stop-smoking programs and medicines. ? Keep trying.   In addition to reducing your risk of diseases in the future, you will notice some benefits soon after you stop using tobacco. If you have shortness of breath or asthma symptoms, they will likely get better within a few weeks after you quit. · Limit how much alcohol you drink. Moderate amounts of alcohol (up to 2 drinks a day for men, 1 drink a day for women) are okay. But drinking too much can lead to liver problems, high blood pressure, and other health problems. Family health  If you have a family, there are many things you can do together to improve your health. · Eat meals together as a family as often as possible. · Eat healthy foods. This includes fruits, vegetables, lean meats and dairy, and whole grains. · Include your family in your fitness plan. Most people think of activities such as jogging or tennis as the way to fitness, but there are many ways you and your family can be more active. Anything that makes you breathe hard and gets your heart pumping is exercise. Here are some tips:  ? Walk to do errands or to take your child to school or the bus.  ? Go for a family bike ride after dinner instead of watching TV. Where can you learn more? Go to https://The Flipping Pro's.SceneShot. org and sign in to your Countdown To Buy account. Enter X219 in the [facility] box to learn more about \"A Healthy Lifestyle: Care Instructions. \"     If you do not have an account, please click on the \"Sign Up Now\" link. Current as of: June 16, 2021               Content Version: 13.0  © 0694-4967 Healthwise, Incorporated. Care instructions adapted under license by [facility] ([facility]). If you have questions about a medical condition or this instruction, always ask your healthcare professional. [place] [address] any warranty or liability for your use of this information.

## 2021-12-15 NOTE — PROGRESS NOTES
Advance Care Planning   Advanced Care Planning: Discussed the patients choices for care and treatment in case of a health event that adversely affects decision-making abilities. Also discussed the patients long-term treatment options. Reviewed with the patient the [address] [place] [address] & [facility] Declaration forms  Reviewed the process of designating a competent adult as an Agent (or -in-fact) that could take make health care decisions for the patient if incompetent. Patient was asked to complete the declaration forms, either acknowledge the forms by a public notary or an eligible witness and provide a signed copy to the practice office. Time spent (minutes): 2      Cardiovascular Disease Risk Counseling: Assessed the patient's risk to develop cardiovascular disease and reviewed main risk factors. Reviewed steps to reduce disease risk including:   · Quitting tobacco use, reducing amount smoked, or not starting the habit  · Making healthy food choices  · Being physically active and gradualy increasing activity levels   · Reduce weight and determine a healthy BMI goal  · Monitor blood pressure and treat if higher than 140/90 mmHg  · Maintain blood total cholesterol levels under 5 mmol/l or 190 mg/dl  · Maintain LDL cholesterol levels under 3.0 mmol/l or 115 mg/dl   · Control blood glucose levels  · Consider taking aspirin (75 mg daily), once blood pressure is controlled   Provided a follow up plan. Time spent (minutes): 3    Well Adult Note    Name: Ofe Norton Date: 12/15/2021   MRN: [id number] Sex: Male   Age: 49 y.o. Ethnicity: Non- / Non    : 1965 Race: White (non-)      Marlin Fregoso is here for well adult exam.  He has a skin rsh that has been evaluated but Dr Tatianna Lema, but he would like a referral to another dermatologist for a second opinion.      Allergies   Allergen Reactions    Shellfish-Derived Products Hives         Prior to Visit Medications    Medication Sig Taking? Authorizing Provider   metFORMIN (GLUCOPHAGE) 1000 MG tablet Take 1 tablet by mouth 2 times daily (with meals) Yes Marybeth Arriola MD   sertraline (ZOLOFT) 50 MG tablet Take 1 tablet by mouth daily Yes TIMOTHY Newman - CNP   atorvastatin (LIPITOR) 20 MG tablet Take 1 tablet by mouth daily Yes Marybeth Arriola MD   ACCU-CHEK MULTICLIX LANCETS MISC Use as directed Yes Marybeth Arriola MD   blood glucose test strips (ASCENSIA AUTODISC VI;ONE TOUCH ULTRA TEST VI) strip 1 each by In Vitro route daily Diagnosis E11.9 Yes Marybeth Arriola MD   Cholecalciferol (VITAMIN D) 2000 units CAPS capsule Take 1 capsule by mouth daily Yes Marybeth Arriola MD   calcium carbonate-vitamin D3 (CALCIUM 600/VITAMIN D) 600-400 MG-UNIT TABS Take 1 tablet by mouth 2 times daily Yes TIMOTHY Bella - CNP   Multiple Vitamins-Minerals (THERAPEUTIC MULTIVITAMIN-MINERALS) tablet Take 1 tablet by mouth daily Yes Historical Provider, MD   Cinnamon 500 MG CAPS Take 1 capsule by mouth 3 times daily (with meals)  Patient taking differently: Take 6 capsules by mouth daily  Yes Tobias Luciano, DO   Blood Glucose Monitoring Suppl ([address]) W/DEVICE KIT 1 kit by Does not apply route daily E11.9 test daily.  Yes Tobias Luciano, DO   aspirin 325 MG tablet Take 325 mg by mouth daily Yes Historical Provider, MD   Liraglutide (VICTOZA) 18 MG/3ML SOPN SC injection Inject 0.6 mg subcu daily for 1 week, then 1.2 mg subcu daily for a week, then 1.8 mg subcu daily  Patient not taking: Reported on 12/15/2021  Marybeth Arriola MD         Past Medical History:   Diagnosis Date    Diabetes mellitus ([facility] [address]     Major depression 12/8/2014    Obesity (BMI 30-39.9) 12/8/2014    PFO with atrial septal aneurysm 2014    [facility] in November 2014    [facility]roke [facility]) 3/10/14    Unspecified cerebral artery occlusion with cerebral infarction        Past Surgical History:   Procedure Laterality Date    [facility]13[facility]14    [facility]     COLONOSCOPY  6/2011    COLONOSCOPY  10/21/2016    Dr. Jacobsen Pu Left 2007    leg    HERNIA REPAIR  2001    TONSILLECTOMY  2011    TRANSESOPHAGEAL ECHOCARDIOGRAM  2014         Family History   Problem Relation Age of Onset    High Blood Pressure Mother     Cancer Father 58        Lung cancer       Social History     Tobacco Use    Smoking status: Former Smoker     Packs/day: 1.50     Years: 30.00     Pack years: 45.00     Types: Cigarettes     Start date: 6/20/1974     Quit date: 3/1/2011     Years since quitting: 10.8    Smokeless tobacco: Never Used   Substance Use Topics    Alcohol use: No    Drug use: No       Objective   /76   Pulse 77   Ht 5' 10\" (1.778 m)   Wt 289 lb 9.6 oz (131.4 kg)   BMI 41.55 kg/m²   Wt Readings from Last 3 Encounters:   12/15/21 289 lb 9.6 oz (131.4 kg)   09/16/21 290 lb 3.2 oz (131.6 kg)   05/05/21 274 lb (124.3 kg)     There were no vitals filed for this visit. Physical Exam:    General-  Alert and oriented x 3, NAD  HEENT: NC, AT, PERRLA, EOMI, anicteric sclerae  Ears: Normal tympanic membranes bilaterally  Nose: patent, no lesions  Mouth: no lesions, moist mucosas  Neck - supple, no significant adenopathy  Chest - clear to auscultation, no wheezes, rales or rhonchi, symmetric air entry  Heart - normal rate, regular rhythm, normal S1, S2, no murmurs, rubs, clicks or gallops  Abdomen - soft, nontender, nondistended, no masses or organomegaly  Extremities - peripheral pulses normal, no pedal edema, no clubbing or cyanosis  Skin: erythematous rash in both hands. Mildly keratinized. .      Assessment   Plan   1. Wellness examination  2. Living will, counseling/discussion  -     [facility] Referral to [facility], Marie Diaz MD, [facility], [facility] [address]  3. Uncontrolled type 2 diabetes mellitus with hyperglycemia [facility])  -     Kala Alvarenga MD, [address] [place] [address], [facility] mgMEDIA [place] [address]  4.  Eczema of both hands  -     External Referral To on the importance of adherence to annual lung cancer LDCT screening, impact of comorbidities, ability and willingness to undergo diagnosis and treatment. Counseled on the importance of maintaining cigarette smoking abstinence and cessation. Patient has a history of heavy tobacco use of over 30 pack years. Patient does not present signs or symptoms of lung cancer.

## 2021-12-16 ENCOUNTER — TELEPHONE (OUTPATIENT)
Dept: FAMILY MEDICINE CLINIC | Age: 56
End: 2021-12-16

## 2021-12-16 NOTE — TELEPHONE ENCOUNTER
----- Message from Raleigh Martin sent at 12/16/2021 10:24 AM EST -----  Subject: Medication Problem    QUESTIONS  Name of Medication? sulfamethoxazole-trimethoprim (BACTRIM DS;SEPTRA DS)   800-160 MG per tablet  Patient-reported dosage and instructions? PLEASE DISREGARD SELECTED   MEDICATION##  What question or problem do you have with the medication? ECC received   call from [facility] who stated he was in the off yesterday   12/15/21 and was told by Dr Amado Augustine that he would receive his new   handicap placard. Pt did not receive in office during appt. Please return   call to PT  Preferred Pharmacy? [facility] - [place], [address] [facility] phone number (if available)? [phone]  Additional Information for Provider?   ---------------------------------------------------------------------------  --------------  [address]  What is the best way for the office to contact you? OK to leave message on   voicemail  Preferred Call Back Phone Number? [phone]  ---------------------------------------------------------------------------  --------------  SCRIPT ANSWERS  Relationship to Patient?  Self

## 2021-12-16 NOTE — LETTER
[address]  [address]  [facility] [address]  Phone: [phone]  Fax: [phone]    Charlie Roberts MD         December 17, 2021     Patient: Sylvia Green   YOB: 1965   Date of Visit: 12/16/2021       To Whom It May Concern: It is my medical opinion that Regine Trimble requires a disability parking placard for the following reasons:  He cannot walk 200 feet without stopping to rest.  Duration of need: 3 years    If you have any questions or concerns, please don't hesitate to call.     Sincerely,            Charlie Roberts MD hypoglycemia

## 2021-12-16 NOTE — TELEPHONE ENCOUNTER
Okay to renew handicap placard for the patient-- Please advise. Patient would like mailed to home address on file.

## 2021-12-22 ENCOUNTER — CLINICAL DOCUMENTATION (OUTPATIENT)
Dept: SPIRITUAL SERVICES | Facility: CLINIC | Age: 56
End: 2021-12-22

## 2021-12-22 NOTE — ACP (ADVANCE CARE PLANNING)
Advance Care Planning   Ambulatory ACP Specialist Patient Outreach    Date:  12/22/2021  ACP Specialist:  Joey Tracy    Outreach call to patient in follow-up to ACP Specialist referral from: Renee Auguste MD    [x] PCP  [] Provider   [] Ambulatory Care Management [] Other for Reason:    [x] Advance Directive Assistance  [] Code Status Discussion  [] Complete Portable DNR Order  [x] Discuss Goals of Care  [] Complete POST/MOST  [] Early ACP Decision-Making  [] Other    Date Referral Received: 12/15/2021    Today's Outreach:  [x] First   [] Second  [] Third                               Third outreach made by []  phone  [] email []   [email or web address]     Intervention:  [] Spoke with Patient  [x] Left VM requesting return call      Outcome:     made an initial attempt to contact Chanel Jordan via telephone call to offer support, if desired, for the completion of Advance Directives documents as part of an [address] conversation. * No answer. Left message. *  will follow-up. Next Step:   [] ACP scheduled conversation  [x] Outreach again in one week               [] Email / Mail ACP Info Sheets  [] Email / Mail Advance Directive            [] Close Referral. Routing closure to referring provider/staff and to ACP Specialist .      Thank you for this referral.

## 2022-01-06 ENCOUNTER — NURSE ONLY (OUTPATIENT)
Dept: LAB | Age: 57
End: 2022-01-06

## 2022-01-06 DIAGNOSIS — Z11.59 NEED FOR HEPATITIS C SCREENING TEST: ICD-10-CM

## 2022-01-06 LAB — HEPATITIS C ANTIBODY: NEGATIVE

## 2022-01-18 ENCOUNTER — CLINICAL DOCUMENTATION (OUTPATIENT)
Dept: SPIRITUAL SERVICES | Facility: CLINIC | Age: 57
End: 2022-01-18

## 2022-01-18 NOTE — ACP (ADVANCE CARE PLANNING)
Advance Care Planning   Ambulatory ACP Specialist Patient Outreach    Date:  1/18/2022  ACP Specialist:  Norbert Rushing    Outreach call to patient in follow-up to ACP Specialist referral from: Rj Marcum MD    [x] PCP  [] Provider   [] Ambulatory Care Management [] Other for Reason:    [x] Advance Directive Assistance  [] Code Status Discussion  [] Complete Portable DNR Order  [x] Discuss Goals of Care  [] Complete POST/MOST  [] Early ACP Decision-Making  [] Other    Date Referral Received: 12/15/2021    Today's Outreach:  [] First   [x] Second  [] Third                               Third outreach made by []  phone  [] email []   Squirrot     Intervention:  [] Spoke with Patient  [x] Left VM requesting return call      Outcome:  Nhung David made a 2nd attempt to contact [facility] via telephone call to offer support, if desired, for the completion of Advance Directives documents as part of an [address] conversation. * No answer. Left message. *  will follow-up. Next Step:   [] ACP scheduled conversation  [x] Outreach again in one week               [] Email / Mail ACP Info Sheets  [] Email / Mail Advance Directive            [] Close Referral. Routing closure to referring provider/staff and to ACP Specialist .      Thank you for this referral.

## 2022-01-24 RX ORDER — ATORVASTATIN CALCIUM 20 MG/1
20 TABLET, FILM COATED ORAL DAILY
Qty: 90 TABLET | Refills: 1 | Status: SHIPPED | OUTPATIENT
Start: 2022-01-24 | End: 2022-07-25 | Stop reason: SDUPTHER

## 2022-01-27 ENCOUNTER — CLINICAL DOCUMENTATION (OUTPATIENT)
Dept: SPIRITUAL SERVICES | Facility: CLINIC | Age: 57
End: 2022-01-27

## 2022-01-27 NOTE — ACP (ADVANCE CARE PLANNING)
Advance Care Planning   Ambulatory ACP Specialist Patient Outreach    Date:  1/27/2022  ACP Specialist:  Eric Hager    Outreach call to patient in follow-up to ACP Specialist referral from: Maria Eugenia Parada MD    [x] PCP  [] Provider   [] Ambulatory Care Management [] Other for Reason:    [x] Advance Directive Assistance  [] Code Status Discussion  [] Complete Portable DNR Order  [x] Discuss Goals of Care  [] Complete POST/MOST  [] Early ACP Decision-Making  [] Other    Date Referral Received: 12/15/2021    Today's Outreach:  [] First   [] Second  [x] Third                               Third outreach made by [x]  phone  [] email []   [employer]     Intervention:  [] Spoke with Patient  [x] Left VM requesting return call      Outcome:     made a 3rd and final attempt to contact Amber Grijalva via telephone call to offer support, if desired, for the completion of Advance Directives documents as part of an [address] conversation. * No answer. Left message. *  sent a follow-up letter, brochures, and business card to assist moving forward. Next Step:   [] ACP scheduled conversation  [] Outreach again in one week               [x] Email / Mail ACP Info Sheets  [] Email / Mail Advance Directive            [x] Close Referral. Routing closure to referring provider/staff and to ACP Specialist .      Thank you for this referral.

## 2022-03-03 ENCOUNTER — TELEPHONE (OUTPATIENT)
Dept: FAMILY MEDICINE CLINIC | Age: 57
End: 2022-03-03

## 2022-03-03 ENCOUNTER — OFFICE VISIT (OUTPATIENT)
Dept: FAMILY MEDICINE CLINIC | Age: 57
End: 2022-03-03
Payer: COMMERCIAL

## 2022-03-03 VITALS
HEIGHT: 70 IN | SYSTOLIC BLOOD PRESSURE: 122 MMHG | BODY MASS INDEX: 41.37 KG/M2 | OXYGEN SATURATION: 98 % | WEIGHT: 289 LBS | HEART RATE: 78 BPM | DIASTOLIC BLOOD PRESSURE: 72 MMHG

## 2022-03-03 DIAGNOSIS — I69.90 LATE EFFECTS OF CVA (CEREBROVASCULAR ACCIDENT): Primary | ICD-10-CM

## 2022-03-03 DIAGNOSIS — Z86.73 HISTORY OF CVA (CEREBROVASCULAR ACCIDENT): ICD-10-CM

## 2022-03-03 PROCEDURE — G8427 DOCREV CUR MEDS BY ELIG CLIN: HCPCS | Performed by: NURSE PRACTITIONER

## 2022-03-03 PROCEDURE — 1036F TOBACCO NON-USER: CPT | Performed by: NURSE PRACTITIONER

## 2022-03-03 PROCEDURE — G8482 FLU IMMUNIZE ORDER/ADMIN: HCPCS | Performed by: NURSE PRACTITIONER

## 2022-03-03 PROCEDURE — 99214 OFFICE O/P EST MOD 30 MIN: CPT | Performed by: NURSE PRACTITIONER

## 2022-03-03 PROCEDURE — G8417 CALC BMI ABV UP PARAM F/U: HCPCS | Performed by: NURSE PRACTITIONER

## 2022-03-03 PROCEDURE — 3017F COLORECTAL CA SCREEN DOC REV: CPT | Performed by: NURSE PRACTITIONER

## 2022-03-03 ASSESSMENT — PATIENT HEALTH QUESTIONNAIRE - PHQ9
7. TROUBLE CONCENTRATING ON THINGS, SUCH AS READING THE NEWSPAPER OR WATCHING TELEVISION: 0
3. TROUBLE FALLING OR STAYING ASLEEP: 0
9. THOUGHTS THAT YOU WOULD BE BETTER OFF DEAD, OR OF HURTING YOURSELF: 0
SUM OF ALL RESPONSES TO PHQ QUESTIONS 1-9: 0
1. LITTLE INTEREST OR PLEASURE IN DOING THINGS: 0
6. FEELING BAD ABOUT YOURSELF - OR THAT YOU ARE A FAILURE OR HAVE LET YOURSELF OR YOUR FAMILY DOWN: 0
8. MOVING OR SPEAKING SO SLOWLY THAT OTHER PEOPLE COULD HAVE NOTICED. OR THE OPPOSITE, BEING SO FIGETY OR RESTLESS THAT YOU HAVE BEEN MOVING AROUND A LOT MORE THAN USUAL: 0
SUM OF ALL RESPONSES TO PHQ9 QUESTIONS 1 & 2: 0
SUM OF ALL RESPONSES TO PHQ QUESTIONS 1-9: 0
5. POOR APPETITE OR OVEREATING: 0
4. FEELING TIRED OR HAVING LITTLE ENERGY: 0
SUM OF ALL RESPONSES TO PHQ QUESTIONS 1-9: 0
SUM OF ALL RESPONSES TO PHQ QUESTIONS 1-9: 0
2. FEELING DOWN, DEPRESSED OR HOPELESS: 0
10. IF YOU CHECKED OFF ANY PROBLEMS, HOW DIFFICULT HAVE THESE PROBLEMS MADE IT FOR YOU TO DO YOUR WORK, TAKE CARE OF THINGS AT HOME, OR GET ALONG WITH OTHER PEOPLE: 0

## 2022-03-03 NOTE — PROGRESS NOTES
Gemini Caceres (:  1965) is a 49 y.o. male,Established patient, here for evaluation of the following chief complaint(s):  Otalgia (swollen; started 3/4 days ago ) and Other (work release paper )         ASSESSMENT/PLAN:  1. Late effects of CVA (cerebrovascular accident)  -     [facility] - [facility]  2. History of CVA (cerebrovascular accident)  -     [facility]    Refer to PT, request ASAP for functional assessment. He has the job description form of duties he needs to be cleared for. Advised OTC cerumen irrigation product for right otalgia. No follow-ups on file. Subjective   SUBJECTIVE/OBJECTIVE:  HPI  Patient states he was asked to go to a new job function at work in Dec, and given his hx of stroke in , he may require some restrictions and clearance. He states he spoke with Dr Oswaldo Godwin about this at his Wellness on 12/15/21, and Mariposa Schultz was going to take care of it\" but he never heard anything else. Apparently, he then did some sort of assessment at work due to the nature of climbing to elevated heights, and failed tandem walk test, according to the plant medical director, Dr Kip Mesa, who called me personally to discuss the patient on 22. [place] Ee states he has not been able to walk heel to toe since his stroke March 10, 2014   [facility] requested a 3rd party evaluation to assess function capability for the job description, so we agreed I would see Marium Ramirez, and refer him to PT for the assessment. Marium Ramirez was sent a [employer] message on 22 regarding the need for an appt, but he doesn't use [employer] so he never read it. Today he is here and quite upset that he is still dealing with this process of getting the assessment and appropriate restrictions, if needed. He states this would all be fine if the plant would just put him in a different area, and if afraid he may lose his job. Denies dizziness on a regular basis.  He will feel off balance if he is laying down and sits up fast, and it will resolve clearly. This has been present since his CVA. He has some intermittent dizziness currently, along with some right sided otalgia. Review of Systems   HENT: Positive for ear pain. All other systems reviewed and are negative. Objective   Physical Exam  Vitals and nursing note reviewed. HENT:      Head: Normocephalic. Right Ear: External ear normal. There is impacted cerumen. Left Ear: External ear normal.   Eyes:      Pupils: Pupils are equal, round, and reactive to light. Neck:      Trachea: No tracheal deviation. Cardiovascular:      Rate and Rhythm: Normal rate and regular rhythm. Heart sounds: Normal heart sounds. No murmur heard. No friction rub. No gallop. Pulmonary:      Effort: Pulmonary effort is normal. No respiratory distress. Breath sounds: Normal breath sounds. No wheezing or rales. Chest:      Chest wall: No tenderness. Abdominal:      General: Bowel sounds are normal.      Palpations: Abdomen is soft. Tenderness: There is no abdominal tenderness. There is no rebound. Genitourinary:     Penis: Normal.    Musculoskeletal:         General: Normal range of motion. Cervical back: Full passive range of motion without pain, normal range of motion and neck supple. Lymphadenopathy:      Cervical: No cervical adenopathy. Skin:     General: Skin is warm and dry. Findings: No rash. Neurological:      Mental Status: He is alert and oriented to person, place, and time. Coordination: Romberg sign negative. Gait: Tandem walk abnormal.   Psychiatric:         Judgment: Judgment normal.            On this date 3/3/2022 I have spent 30 minutes reviewing previous notes, test results and face to face with the patient discussing the diagnosis and importance of compliance with the treatment plan as well as documenting on the day of the visit.       An electronic signature was used to authenticate this note.     --Lilo Mackey, APRN - CNP

## 2022-03-03 NOTE — TELEPHONE ENCOUNTER
Outgoing email to Ligia Gasca at [facility] for assistance work clearance paperwork for [employer]. Per Dr. Stan Balderas at [facility] [employer], this person would be able to compete the necessary paperwork for the patient to be able to complete his job requirements. All paperwork faxed to Nina Sauceda. Confirmation scanned into chart. Email message below. Fred Lindsay! We have Niya Vogel here asking about clearance to return to work for [employer]. We have given him your info; could you please advise him/us on what he needs to do. I have [address] [place] [address] the MD office here in [facility]. Thanks! Loren Ibarra 3/3/2022 10:31 AM  Do you know if he is on workers comp/work related injury? Has he been seen at [facility]? He may need to contact [facility] to see what they are requiring for him to be cleared to return to work. Let me know what you think Ligia Gasca PT, DPT, MS, MARCOS Manager Occupational    Dawson Bull  Thu 3/3/2022 12:28 PM  Jayden Kingsley had a stroke. You did his PT. I can fax you the paperwork they need filled out if you like. Dr. Laure Rodriguez, ( I apologize if I spelled that wrong), said you would be able to fill out this paperwork. Swathi Reyez LPN     7/8/8027 71:06 PM  Bobby Irish  Thu 3/3/2022 12:30 PM  I can look at the paperwork. I would have done his FCE most likely as I no longer see recurring patients. Once I receive the paperwork I will pull up his report I completed and fill it out. Thanks! Ligia Gasca PT, DPT, MS, [address] Fellow    Certificate [address]    [address] [facility]    [phone]    Fax [phone]    [email or web address]         Dawson Bull  Thu 3/3/2022 12:32 PM  Its coming to you now. Thank you for looking into this.      Swathi Reyez LPN

## 2022-06-20 ENCOUNTER — NURSE ONLY (OUTPATIENT)
Dept: LAB | Age: 57
End: 2022-06-20

## 2022-06-20 ENCOUNTER — OFFICE VISIT (OUTPATIENT)
Dept: FAMILY MEDICINE CLINIC | Age: 57
End: 2022-06-20
Payer: COMMERCIAL

## 2022-06-20 VITALS
DIASTOLIC BLOOD PRESSURE: 84 MMHG | HEIGHT: 70 IN | HEART RATE: 76 BPM | BODY MASS INDEX: 42.66 KG/M2 | SYSTOLIC BLOOD PRESSURE: 128 MMHG | WEIGHT: 298 LBS

## 2022-06-20 DIAGNOSIS — E11.65 UNCONTROLLED TYPE 2 DIABETES MELLITUS WITH HYPERGLYCEMIA (HCC): Primary | ICD-10-CM

## 2022-06-20 DIAGNOSIS — E11.65 UNCONTROLLED TYPE 2 DIABETES MELLITUS WITH HYPERGLYCEMIA (HCC): ICD-10-CM

## 2022-06-20 DIAGNOSIS — E55.9 VITAMIN D DEFICIENCY: ICD-10-CM

## 2022-06-20 DIAGNOSIS — R45.4 DIFFICULTY CONTROLLING ANGER: ICD-10-CM

## 2022-06-20 LAB
ALBUMIN SERPL-MCNC: 4.7 G/DL (ref 3.5–5.1)
ALP BLD-CCNC: 131 U/L (ref 38–126)
ALT SERPL-CCNC: 65 U/L (ref 11–66)
ANION GAP SERPL CALCULATED.3IONS-SCNC: 14 MEQ/L (ref 8–16)
AST SERPL-CCNC: 32 U/L (ref 5–40)
BACTERIA: ABNORMAL
BASOPHILS # BLD: 0.2 %
BASOPHILS ABSOLUTE: 0 THOU/MM3 (ref 0–0.1)
BILIRUB SERPL-MCNC: 0.3 MG/DL (ref 0.3–1.2)
BILIRUBIN URINE: NEGATIVE
BLOOD, URINE: NEGATIVE
BUN BLDV-MCNC: 12 MG/DL (ref 7–22)
CALCIUM SERPL-MCNC: 9.2 MG/DL (ref 8.5–10.5)
CASTS: ABNORMAL /LPF
CASTS: ABNORMAL /LPF
CHARACTER, URINE: ABNORMAL
CHLORIDE BLD-SCNC: 97 MEQ/L (ref 98–111)
CHOLESTEROL, FASTING: 128 MG/DL (ref 100–199)
CO2: 28 MEQ/L (ref 23–33)
COLOR: YELLOW
CREAT SERPL-MCNC: 0.6 MG/DL (ref 0.4–1.2)
CREATININE, URINE: 204.8 MG/DL
CRYSTALS: ABNORMAL
EOSINOPHIL # BLD: 1 %
EOSINOPHILS ABSOLUTE: 0.1 THOU/MM3 (ref 0–0.4)
EPITHELIAL CELLS, UA: ABNORMAL /HPF
ERYTHROCYTE [DISTWIDTH] IN BLOOD BY AUTOMATED COUNT: 12.5 % (ref 11.5–14.5)
ERYTHROCYTE [DISTWIDTH] IN BLOOD BY AUTOMATED COUNT: 44 FL (ref 35–45)
GFR SERPL CREATININE-BSD FRML MDRD: > 90 ML/MIN/1.73M2
GLUCOSE BLD-MCNC: 193 MG/DL (ref 70–108)
GLUCOSE, URINE: NEGATIVE MG/DL
HCT VFR BLD CALC: 45.6 % (ref 42–52)
HDLC SERPL-MCNC: 30 MG/DL
HEMOGLOBIN: 14.2 GM/DL (ref 14–18)
IMMATURE GRANS (ABS): 0.08 THOU/MM3 (ref 0–0.07)
IMMATURE GRANULOCYTES: 0.8 %
KETONES, URINE: ABNORMAL
LDL CHOLESTEROL CALCULATED: 59 MG/DL
LEUKOCYTE ESTERASE, URINE: ABNORMAL
LYMPHOCYTES # BLD: 23.5 %
LYMPHOCYTES ABSOLUTE: 2.3 THOU/MM3 (ref 1–4.8)
MCH RBC QN AUTO: 29.8 PG (ref 26–33)
MCHC RBC AUTO-ENTMCNC: 31.1 GM/DL (ref 32.2–35.5)
MCV RBC AUTO: 95.6 FL (ref 80–94)
MICROALBUMIN UR-MCNC: 3.56 MG/DL
MICROALBUMIN/CREAT UR-RTO: 17 MG/G (ref 0–30)
MISCELLANEOUS LAB TEST RESULT: ABNORMAL
MONOCYTES # BLD: 5.1 %
MONOCYTES ABSOLUTE: 0.5 THOU/MM3 (ref 0.4–1.3)
NITRITE, URINE: NEGATIVE
NUCLEATED RED BLOOD CELLS: 0 /100 WBC
PH UA: 6 (ref 5–9)
PLATELET # BLD: 305 THOU/MM3 (ref 130–400)
PMV BLD AUTO: 11.3 FL (ref 9.4–12.4)
POTASSIUM SERPL-SCNC: 4 MEQ/L (ref 3.5–5.2)
PROTEIN UA: ABNORMAL MG/DL
RBC # BLD: 4.77 MILL/MM3 (ref 4.7–6.1)
RBC URINE: ABNORMAL /HPF
RENAL EPITHELIAL, UA: ABNORMAL
SEG NEUTROPHILS: 69.4 %
SEGMENTED NEUTROPHILS ABSOLUTE COUNT: 6.7 THOU/MM3 (ref 1.8–7.7)
SODIUM BLD-SCNC: 139 MEQ/L (ref 135–145)
SPECIFIC GRAVITY UA: 1.03 (ref 1–1.03)
TOTAL PROTEIN: 7.4 G/DL (ref 6.1–8)
TRIGLYCERIDE, FASTING: 193 MG/DL (ref 0–199)
UROBILINOGEN, URINE: 1 EU/DL (ref 0–1)
VITAMIN D 25-HYDROXY: 50 NG/ML (ref 30–100)
WBC # BLD: 9.6 THOU/MM3 (ref 4.8–10.8)
WBC UA: ABNORMAL /HPF
YEAST: ABNORMAL

## 2022-06-20 PROCEDURE — G8427 DOCREV CUR MEDS BY ELIG CLIN: HCPCS | Performed by: NURSE PRACTITIONER

## 2022-06-20 PROCEDURE — G8417 CALC BMI ABV UP PARAM F/U: HCPCS | Performed by: NURSE PRACTITIONER

## 2022-06-20 PROCEDURE — 3046F HEMOGLOBIN A1C LEVEL >9.0%: CPT | Performed by: NURSE PRACTITIONER

## 2022-06-20 PROCEDURE — 1036F TOBACCO NON-USER: CPT | Performed by: NURSE PRACTITIONER

## 2022-06-20 PROCEDURE — 2022F DILAT RTA XM EVC RTNOPTHY: CPT | Performed by: NURSE PRACTITIONER

## 2022-06-20 PROCEDURE — 99214 OFFICE O/P EST MOD 30 MIN: CPT | Performed by: NURSE PRACTITIONER

## 2022-06-20 PROCEDURE — 3017F COLORECTAL CA SCREEN DOC REV: CPT | Performed by: NURSE PRACTITIONER

## 2022-06-20 RX ORDER — PEN NEEDLE, DIABETIC 30 GX5/16"
1 NEEDLE, DISPOSABLE MISCELLANEOUS DAILY
Qty: 100 EACH | Refills: 3 | Status: SHIPPED | OUTPATIENT
Start: 2022-06-20 | End: 2022-08-24

## 2022-06-20 NOTE — PROGRESS NOTES
Rich Gray (:  1965) is a 49 y.o. male,Established patient, here for evaluation of the following chief complaint(s):  6 Month Follow-Up         ASSESSMENT/PLAN:  1. Uncontrolled type 2 diabetes mellitus with hyperglycemia (HCC)  -     CBC with Auto Differential; Future  -     Comprehensive Metabolic Panel; Future  -     Urinalysis with Microscopic; Future  -     Lipid, Fasting; Future  -     Microalbumin / Creatinine Urine Ratio; Future  -     Vitamin D 25 Hydroxy; Future  -     Semaglutide,0.25 or 0.5MG/DOS, 2 MG/1.5ML SOPN; Inject 0.25 mg into the skin once a week, Disp-1 pen, R-5Normal  -     Insulin Pen Needle (PEN NEEDLES 3/16\") 31G X 5 MM MISC; DAILY Starting Mon 2022, Disp-100 each, R-3, Normal  -     HM DIABETES FOOT EXAM  2. Vitamin D deficiency  -     CBC with Auto Differential; Future  -     Comprehensive Metabolic Panel; Future  -     Urinalysis with Microscopic; Future  -     Lipid, Fasting; Future  -     Microalbumin / Creatinine Urine Ratio; Future  -     Vitamin D 25 Hydroxy; Future  3. Difficulty controlling anger  -     CBC with Auto Differential; Future  -     Comprehensive Metabolic Panel; Future  -     Urinalysis with Microscopic; Future  -     Lipid, Fasting; Future  -     Microalbumin / Creatinine Urine Ratio; Future  -     Vitamin D 25 Hydroxy; Future    Will start Ozempic, 0.25 mg injected weekly x1 month, then go to 0.5 mg injected weekly for 1 month until follow-up. Patient is to notify us if this is too expensive, so we can make other adjustments. Counseled on diet and exercise changes. Will try to get reports from [facility] regarding possible diabetic retinal exam.  Fasting lab work before next visit. Return in about 2 months (around 2022) for diabetes. Subjective   SUBJECTIVE/OBJECTIVE:  HPI    Diabetes: taking Metformin 1000 mg 1 tablet twice daily.   Was ordered Victoza in 2021, but it was going cost $500, so he never picked it up, and we were not notifed. HgbA1c is 8.6% today, up from 7% in Sept.   Does not follow a diabetic diet. No exercising for health, but works full time at [employer]. Not checking glucoses at home. Unsure if up to date on diabetic retinal exam, went to [employer] for work to get new safety glasses. Due for urine microalbumin, up to date with hep B, Prevnar 13, pneumococcal 23, tetanus and Shingrix. He had his flu shot for 20 21-22 season. He had 1 dose of J&J COVID-vaccine, has not had a booster. He complains of chronic fatigue. Lipitor and ASA daily. Not following a low fat diet. According to PCP note: Right cerebellar stroke:  in March 10, 2014 he had a stroke. He was admitted to [address] [place] [address] for syncope. He deteriorated after admission. MRI of brain showed a subacute ischemic infarction of the right cerebellum. He was transfered to [facility]. He was found to have a fourth ventricle effacement, mass effect, cerebellar tonsil inferior displacement. He underwent a suboccipital craniectomy (3/13/14) with infarcted tissue resection. Work up at the [facility] in [place] showed a patent foramen ovale with a small septal aneurysm and significant right to left shunting. He underwent successful closure of the PFO using ICE-guided placement of a  25 mm Amplatzer occluder device on 11/17/14. He could not be on Coumadin due to his past CNS issues. Hypercoagulable workup was negative. He was able to return to work after he had his PFO closed in November 20, 2014    Anger/mood: Zoloft 50 mg daily. He developed anger issues after his stroke in 2014. Vit D Deficiency: Vit D3 2000 IU daily. Level was 41 in May 2021. Review of Systems   Constitutional: Positive for fatigue. All other systems reviewed and are negative. Objective   Physical Exam  Vitals and nursing note reviewed. HENT:      Head: Normocephalic.       Right Ear: External ear normal.      Left Ear: External ear normal. Eyes:      Pupils: Pupils are equal, round, and reactive to light. Neck:      Trachea: No tracheal deviation. Cardiovascular:      Rate and Rhythm: Normal rate and regular rhythm. Heart sounds: Normal heart sounds. No murmur heard. No friction rub. No gallop. Pulmonary:      Effort: Pulmonary effort is normal. No respiratory distress. Breath sounds: Normal breath sounds. No wheezing or rales. Chest:      Chest wall: No tenderness. Abdominal:      General: Bowel sounds are normal.      Palpations: Abdomen is soft. Tenderness: There is no abdominal tenderness. There is no rebound. Genitourinary:     Penis: Normal.    Musculoskeletal:         General: Normal range of motion. Cervical back: Full passive range of motion without pain, normal range of motion and neck supple. Feet:      Comments: Visual inspection:  Deformity/amputation: absent  Skin lesions/pre-ulcerative calluses: absent  Edema: right- negative, left- negative    Sensory exam:  Monofilament sensation: normal  (minimum of 5 random plantar locations tested, avoiding callused areas - > 1 area with absence of sensation is + for neuropathy)    Plus at least one of the following:  Pulses: normal,   Pinprick: Intact  Proprioception: Intact  Vibration (128 Hz): N/A    Lymphadenopathy:      Cervical: No cervical adenopathy. Skin:     General: Skin is warm and dry. Findings: No rash. Neurological:      Mental Status: He is alert and oriented to person, place, and time. Psychiatric:         Judgment: Judgment normal.                  An electronic signature was used to authenticate this note.     --TIMOTHY Greer - CNP

## 2022-06-20 NOTE — PATIENT INSTRUCTIONS
Continue Metformin 1 tab twice daily  Add Ozempic injection, 0.25 mg weekly for 1 month, then go to 0.5 mg weekly for 1 month   Diabetic diet

## 2022-06-21 ENCOUNTER — TELEPHONE (OUTPATIENT)
Dept: FAMILY MEDICINE CLINIC | Age: 57
End: 2022-06-21

## 2022-06-21 NOTE — TELEPHONE ENCOUNTER
Patient wife called and notified of results. Patient states understanding. No further questions or concerns at this time.

## 2022-06-21 NOTE — TELEPHONE ENCOUNTER
----- Message from TIMOTHY Muller CNP sent at 6/21/2022 11:13 AM EDT -----  Urine is turbid with trace ketones and protein, which she has had before. Increase water intake. Microalbumin okay. Cholesterol numbers okay except HDL is low. Increase fatty fish in diet, should be taking omega-3 1200 mg 2 caps twice daily. Other lab work within acceptable range.

## 2022-07-26 RX ORDER — ATORVASTATIN CALCIUM 20 MG/1
20 TABLET, FILM COATED ORAL DAILY
Qty: 90 TABLET | Refills: 1 | Status: SHIPPED | OUTPATIENT
Start: 2022-07-26

## 2022-08-24 ENCOUNTER — OFFICE VISIT (OUTPATIENT)
Dept: FAMILY MEDICINE CLINIC | Age: 57
End: 2022-08-24
Payer: COMMERCIAL

## 2022-08-24 VITALS
HEIGHT: 70 IN | TEMPERATURE: 98.3 F | DIASTOLIC BLOOD PRESSURE: 79 MMHG | HEART RATE: 81 BPM | BODY MASS INDEX: 39.37 KG/M2 | WEIGHT: 275 LBS | RESPIRATION RATE: 12 BRPM | SYSTOLIC BLOOD PRESSURE: 123 MMHG

## 2022-08-24 DIAGNOSIS — I69.90 LATE EFFECTS OF CVA (CEREBROVASCULAR ACCIDENT): ICD-10-CM

## 2022-08-24 DIAGNOSIS — E55.9 VITAMIN D DEFICIENCY: ICD-10-CM

## 2022-08-24 DIAGNOSIS — R45.4 DIFFICULTY CONTROLLING ANGER: ICD-10-CM

## 2022-08-24 DIAGNOSIS — E11.9 TYPE 2 DIABETES MELLITUS WITHOUT COMPLICATION, WITHOUT LONG-TERM CURRENT USE OF INSULIN (HCC): Primary | ICD-10-CM

## 2022-08-24 DIAGNOSIS — E78.6 LOW HDL (UNDER 40): ICD-10-CM

## 2022-08-24 LAB — HBA1C MFR BLD: 6.7 %

## 2022-08-24 PROCEDURE — G8427 DOCREV CUR MEDS BY ELIG CLIN: HCPCS | Performed by: NURSE PRACTITIONER

## 2022-08-24 PROCEDURE — 3017F COLORECTAL CA SCREEN DOC REV: CPT | Performed by: NURSE PRACTITIONER

## 2022-08-24 PROCEDURE — 1036F TOBACCO NON-USER: CPT | Performed by: NURSE PRACTITIONER

## 2022-08-24 PROCEDURE — 3044F HG A1C LEVEL LT 7.0%: CPT | Performed by: NURSE PRACTITIONER

## 2022-08-24 PROCEDURE — 83036 HEMOGLOBIN GLYCOSYLATED A1C: CPT | Performed by: NURSE PRACTITIONER

## 2022-08-24 PROCEDURE — 99214 OFFICE O/P EST MOD 30 MIN: CPT | Performed by: NURSE PRACTITIONER

## 2022-08-24 PROCEDURE — 2022F DILAT RTA XM EVC RTNOPTHY: CPT | Performed by: NURSE PRACTITIONER

## 2022-08-24 PROCEDURE — G8417 CALC BMI ABV UP PARAM F/U: HCPCS | Performed by: NURSE PRACTITIONER

## 2022-08-24 RX ORDER — SEMAGLUTIDE 1.34 MG/ML
1 INJECTION, SOLUTION SUBCUTANEOUS WEEKLY
Qty: 3 ML | Refills: 1 | Status: SHIPPED | OUTPATIENT
Start: 2022-08-24 | End: 2022-10-14 | Stop reason: SDUPTHER

## 2022-08-24 RX ORDER — CHLORAL HYDRATE 500 MG
1000 CAPSULE ORAL DAILY
COMMUNITY

## 2022-08-24 NOTE — PROGRESS NOTES
Deana Hansen (:  1965) is a 56 y.o. male,Established patient, here for evaluation of the following chief complaint(s):  Follow-up and Diabetes (A1C today in office )         ASSESSMENT/PLAN:  1. Type 2 diabetes mellitus without complication, without long-term current use of insulin (HCC)  -     POCT glycosylated hemoglobin (Hb A1C)  -     Semaglutide, 1 MG/DOSE, (OZEMPIC, 1 MG/DOSE,) 4 MG/3ML SOPN; Inject 1 mg into the skin once a week, Disp-3 mL, R-1Normal  2. Vitamin D deficiency  3. Late effects of CVA (cerebrovascular accident)  4. Difficulty controlling anger  -     sertraline (ZOLOFT) 50 MG tablet; Take 1 tablet by mouth daily, Disp-90 tablet, R-1Normal  5. Low HDL (under 40)    Increase Ozempic to 1 mg injected weekly to further aid in weight loss and diabetes control. Check with [facility] if they do diabetic retinal exams. If so, get note. If not, needs ophthalmology referral.   Continue Vit D 2000 IU daily for hx deficiency. Continue Zoloft for anger management  Start Omega 3 1000 mg 1 cap daily to increase HDL  Dorian Brown was [place] and celebrated on his better control of A1c and weight loss!!!        Return in about 3 months (around 2022). Subjective   SUBJECTIVE/OBJECTIVE:  HPI    Diabetes: taking Metformin 1000 mg 1 tablet twice daily, Ozempic 0.5 mg injected weekly. HgbA1c today is 6.7%, was 8.6% in . Has decreased some sugar intake. No exercising for health, but works full time at [employer]. Not checking glucoses at home. Unsure if up to date on diabetic retinal exam, went to [facility] for work to get new safety glasses. MIcroalbumin 17 on 22. Up to date with hep B, Prevnar 13, pneumococcal 23, tetanus and Shingrix. He had 1 dose of J&J COVID-vaccine, has not had a booster. Down 23 lb since ! .      Lipitor and ASA daily. Not following a low fat diet. According to PCP note: Right cerebellar stroke:  in March 10, 2014 he had a stroke.   He was admitted to [address] [place] [address] for syncope. He deteriorated after admission. MRI of brain showed a subacute ischemic infarction of the right cerebellum. He was transfered to [address] [place] [address]. He was found to have a fourth ventricle effacement, mass effect, cerebellar tonsil inferior displacement. He underwent a suboccipital craniectomy (3/13/14) with infarcted tissue resection. Work up at the [facility] in [place] showed a patent foramen ovale with a small septal aneurysm and significant right to left shunting. He underwent successful closure of the PFO using ICE-guided placement of a  25 mm Amplatzer occluder device on 11/17/14. He could not be on Coumadin due to his past CNS issues. Hypercoagulable workup was negative. He was able to return to work after he had his PFO closed in November 20, 2014  He continues  mg daily. Low HDL: Fasting lipids on 6/20/2022 showed total cholesterol 128, triglycerides 123, HDL 30, and LDL 59. Anger/mood: Zoloft 50 mg daily. He developed anger issues after his stroke in 2014. Vit D Deficiency: Vit D3 2000 IU daily. Level was 41 in May 2021. Review of Systems   All other systems reviewed and are negative. Objective   Physical Exam  Vitals and nursing note reviewed. HENT:      Head: Normocephalic. Right Ear: External ear normal.      Left Ear: External ear normal.   Eyes:      Pupils: Pupils are equal, round, and reactive to light. Neck:      Trachea: No tracheal deviation. Cardiovascular:      Rate and Rhythm: Normal rate and regular rhythm. Heart sounds: Normal heart sounds. No murmur heard. No friction rub. No gallop. Pulmonary:      Effort: Pulmonary effort is normal. No respiratory distress. Breath sounds: Normal breath sounds. No wheezing or rales. Chest:      Chest wall: No tenderness. Abdominal:      General: Bowel sounds are normal.      Palpations: Abdomen is soft. Tenderness:  There is no abdominal tenderness. There is no rebound. Genitourinary:     Penis: Normal.    Musculoskeletal:         General: Normal range of motion. Cervical back: Full passive range of motion without pain, normal range of motion and neck supple. Lymphadenopathy:      Cervical: No cervical adenopathy. Skin:     General: Skin is warm and dry. Findings: No rash. Neurological:      Mental Status: He is alert and oriented to person, place, and time. Psychiatric:         Judgment: Judgment normal.                An electronic signature was used to authenticate this note.     --TIMOTHY Christianson - CNP

## 2022-10-13 ENCOUNTER — NURSE ONLY (OUTPATIENT)
Dept: FAMILY MEDICINE CLINIC | Age: 57
End: 2022-10-13
Payer: COMMERCIAL

## 2022-10-13 DIAGNOSIS — E11.9 TYPE 2 DIABETES MELLITUS WITHOUT COMPLICATION, WITHOUT LONG-TERM CURRENT USE OF INSULIN (HCC): ICD-10-CM

## 2022-10-13 DIAGNOSIS — Z23 NEED FOR INFLUENZA VACCINATION: Primary | ICD-10-CM

## 2022-10-13 PROCEDURE — 90674 CCIIV4 VAC NO PRSV 0.5 ML IM: CPT | Performed by: NURSE PRACTITIONER

## 2022-10-13 PROCEDURE — 90471 IMMUNIZATION ADMIN: CPT | Performed by: NURSE PRACTITIONER

## 2022-10-13 NOTE — PROGRESS NOTES
Immunizations Administered       Name Date Dose Route    Influenza, FLUCELVAX, (age 10 mo+), MDCK, PF, 0.5mL 10/13/2022 0.5 mL Intramuscular    Site: Deltoid- Right    Lot: [id number]    NDC: 85057-680-33          .

## 2022-10-14 RX ORDER — SEMAGLUTIDE 1.34 MG/ML
1 INJECTION, SOLUTION SUBCUTANEOUS WEEKLY
Qty: 3 ML | Refills: 1 | Status: SHIPPED | OUTPATIENT
Start: 2022-10-14

## 2022-10-19 DIAGNOSIS — E11.9 TYPE 2 DIABETES MELLITUS WITHOUT COMPLICATION, WITHOUT LONG-TERM CURRENT USE OF INSULIN (HCC): ICD-10-CM

## 2022-10-20 RX ORDER — SEMAGLUTIDE 1.34 MG/ML
1 INJECTION, SOLUTION SUBCUTANEOUS WEEKLY
Qty: 3 ML | Refills: 1 | OUTPATIENT
Start: 2022-10-20

## 2022-10-21 DIAGNOSIS — R45.4 DIFFICULTY CONTROLLING ANGER: ICD-10-CM

## 2022-10-24 NOTE — TELEPHONE ENCOUNTER
Zoloft written 8/24/22 for 90 tablets with 1 refill. Please review.    Please approve or deny     Last Visit Date:  8/24/2022   [facility]     Next Visit Date:    11/23/2022

## 2022-11-23 ENCOUNTER — OFFICE VISIT (OUTPATIENT)
Dept: FAMILY MEDICINE CLINIC | Age: 57
End: 2022-11-23
Payer: COMMERCIAL

## 2022-11-23 VITALS
RESPIRATION RATE: 12 BRPM | WEIGHT: 251 LBS | HEIGHT: 70 IN | DIASTOLIC BLOOD PRESSURE: 78 MMHG | TEMPERATURE: 97 F | HEART RATE: 76 BPM | SYSTOLIC BLOOD PRESSURE: 128 MMHG | BODY MASS INDEX: 35.93 KG/M2

## 2022-11-23 DIAGNOSIS — E55.9 VITAMIN D DEFICIENCY: ICD-10-CM

## 2022-11-23 DIAGNOSIS — E11.9 TYPE 2 DIABETES MELLITUS WITHOUT COMPLICATION, WITHOUT LONG-TERM CURRENT USE OF INSULIN (HCC): Primary | ICD-10-CM

## 2022-11-23 DIAGNOSIS — Z86.73 HISTORY OF CVA (CEREBROVASCULAR ACCIDENT): ICD-10-CM

## 2022-11-23 DIAGNOSIS — Z87.891 PERSONAL HISTORY OF TOBACCO USE: ICD-10-CM

## 2022-11-23 DIAGNOSIS — R45.4 DIFFICULTY CONTROLLING ANGER: ICD-10-CM

## 2022-11-23 PROCEDURE — 3044F HG A1C LEVEL LT 7.0%: CPT | Performed by: NURSE PRACTITIONER

## 2022-11-23 PROCEDURE — G8427 DOCREV CUR MEDS BY ELIG CLIN: HCPCS | Performed by: NURSE PRACTITIONER

## 2022-11-23 PROCEDURE — 1036F TOBACCO NON-USER: CPT | Performed by: NURSE PRACTITIONER

## 2022-11-23 PROCEDURE — 99214 OFFICE O/P EST MOD 30 MIN: CPT | Performed by: NURSE PRACTITIONER

## 2022-11-23 PROCEDURE — G8417 CALC BMI ABV UP PARAM F/U: HCPCS | Performed by: NURSE PRACTITIONER

## 2022-11-23 PROCEDURE — 3017F COLORECTAL CA SCREEN DOC REV: CPT | Performed by: NURSE PRACTITIONER

## 2022-11-23 PROCEDURE — G0296 VISIT TO DETERM LDCT ELIG: HCPCS | Performed by: NURSE PRACTITIONER

## 2022-11-23 PROCEDURE — 2022F DILAT RTA XM EVC RTNOPTHY: CPT | Performed by: NURSE PRACTITIONER

## 2022-11-23 PROCEDURE — G8482 FLU IMMUNIZE ORDER/ADMIN: HCPCS | Performed by: NURSE PRACTITIONER

## 2022-11-23 RX ORDER — ATORVASTATIN CALCIUM 20 MG/1
20 TABLET, FILM COATED ORAL DAILY
Qty: 90 TABLET | Refills: 1 | Status: SHIPPED | OUTPATIENT
Start: 2022-11-23

## 2022-11-23 SDOH — ECONOMIC STABILITY: FOOD INSECURITY: WITHIN THE PAST 12 MONTHS, THE FOOD YOU BOUGHT JUST DIDN'T LAST AND YOU DIDN'T HAVE MONEY TO GET MORE.: NEVER TRUE

## 2022-11-23 SDOH — ECONOMIC STABILITY: FOOD INSECURITY: WITHIN THE PAST 12 MONTHS, YOU WORRIED THAT YOUR FOOD WOULD RUN OUT BEFORE YOU GOT MONEY TO BUY MORE.: NEVER TRUE

## 2022-11-23 ASSESSMENT — SOCIAL DETERMINANTS OF HEALTH (SDOH): HOW HARD IS IT FOR YOU TO PAY FOR THE VERY BASICS LIKE FOOD, HOUSING, MEDICAL CARE, AND HEATING?: NOT HARD AT ALL

## 2022-11-23 NOTE — PROGRESS NOTES
Peggy Silva (:  1965) is a 56 y.o. male,Established patient, here for evaluation of the following chief complaint(s):  Follow-up and Diabetes (Last A1C 22 = 6.7 )         ASSESSMENT/PLAN:  1. Type 2 diabetes mellitus without complication, without long-term current use of insulin (HCC)  -     Hemoglobin A1C; Future  2. History of CVA (cerebrovascular accident)  -     atorvastatin (LIPITOR) 20 MG tablet; Take 1 tablet by mouth daily, Disp-90 tablet, R-1Normal  3. Difficulty controlling anger  4. Vitamin D deficiency  5. Personal history of tobacco use  -     PA VISIT TO DISCUSS LUNG CA SCREEN W LDCT  -     CT Lung Screen (Annual); Future    No follow-ups on file. Subjective   SUBJECTIVE/OBJECTIVE:  HPI    Diabetes: taking Metformin 1000 mg 1 tablet twice daily, Ozempic 1 mg injected weekly. HgbA1c was 6.7% in 22, was 8.6% in . Has decreased some sugar intake. No exercising for health, but works full time at [employer]. Not checking glucoses at home. Unsure if up to date on diabetic retinal exam, went to [facility] for work to get new safety glasses. MIcroalbumin 17 on 22. Up to date with hep B, Prevnar 13, pneumococcal 23, tetanus and Shingrix. He had 1 dose of J&J COVID-vaccine, has not had a booster. Down 47 lb since ! .      Lipitor and ASA daily. Not following a low fat diet. According to PCP note: Right cerebellar stroke:  in March 10, 2014 he had a stroke. He was admitted to [address] [place] [address] for syncope. He deteriorated after admission. MRI of brain showed a subacute ischemic infarction of the right cerebellum. He was transfered to [facility]. He was found to have a fourth ventricle effacement, mass effect, cerebellar tonsil inferior displacement. He underwent a suboccipital craniectomy (3/13/14) with infarcted tissue resection.   Work up at the [facility] in [place] showed a patent foramen ovale with a small septal aneurysm and significant right to left shunting. He underwent successful closure of the PFO using ICE-guided placement of a  25 mm Amplatzer occluder device on 11/17/14. He could not be on Coumadin due to his past CNS issues. Hypercoagulable workup was negative. He was able to return to work after he had his PFO closed in November 20, 2014  He continues  mg daily. Low HDL: Fasting lipids on 6/20/2022 showed total cholesterol 128, triglycerides 123, HDL 30, and LDL 59. Anger/mood: Zoloft 50 mg daily. He developed anger issues after his stroke in 2014. Vit D Deficiency: Vit D3 2000 IU daily. Level was 41 in May 2021. Review of Systems   All other systems reviewed and are negative. Objective   Physical Exam  Vitals and nursing note reviewed. HENT:      Head: Normocephalic. Right Ear: External ear normal.      Left Ear: External ear normal.   Eyes:      Pupils: Pupils are equal, round, and reactive to light. Neck:      Trachea: No tracheal deviation. Cardiovascular:      Rate and Rhythm: Normal rate and regular rhythm. Heart sounds: Normal heart sounds. No murmur heard. No friction rub. No gallop. Pulmonary:      Effort: Pulmonary effort is normal. No respiratory distress. Breath sounds: Normal breath sounds. No wheezing or rales. Chest:      Chest wall: No tenderness. Abdominal:      General: Bowel sounds are normal.      Palpations: Abdomen is soft. Tenderness: There is no abdominal tenderness. There is no rebound. Genitourinary:     Penis: Normal.    Musculoskeletal:         General: Normal range of motion. Cervical back: Full passive range of motion without pain, normal range of motion and neck supple. Lymphadenopathy:      Cervical: No cervical adenopathy. Skin:     General: Skin is warm and dry. Findings: No rash. Neurological:      Mental Status: He is alert and oriented to person, place, and time.    Psychiatric:         Judgment: Judgment normal. An electronic signature was used to authenticate this note. --TIMOTHY Chambers CNP Discussed with the patient the current USPSTF guidelines released March 9, 2021 for screening for lung cancer. For adults aged 48 to [de-identified] years who have a 20 pack-year smoking history and currently smoke or have quit within the past 15 years the grade B recommendation is to:  Screen for lung cancer with low-dose computed tomography (LDCT) every year. Stop screening once a person has not smoked for 15 years or has a health problem that limits life expectancy or the ability to have lung surgery. The patient  reports that he quit smoking about 11 years ago. His smoking use included cigarettes. He started smoking about 48 years ago. He has a 45.00 pack-year smoking history. He has never used smokeless tobacco.. Discussed with patient the risks and benefits of screening, including over-diagnosis, false positive rate, and total radiation exposure. The patient currently exhibits no signs or symptoms suggestive of lung cancer. Discussed with patient the importance of compliance with yearly annual lung cancer screenings and willingness to undergo diagnosis and treatment if screening scan is positive. In addition, the patient was counseled regarding the importance of remaining smoke free and/or total smoking cessation.     Also reviewed the following if the patient has Medicare that as of February 10, 2022, Medicare only covers LDCT screening in patients aged 51-72 with at least a 20 pack-year smoking history who currently smoke or have quit in the last 15 years

## 2022-11-23 NOTE — PATIENT INSTRUCTIONS
You may receive a survey about your visit with us today. The feedback from our patients helps us identify what is working well and where the service to all patients can be enhanced. Thank you! Learning About Lung Cancer Screening  What is screening for lung cancer? Lung cancer screening is a way to find some lung cancers early, before a person has any symptoms of the cancer. Lung cancer screening may help those who have the highest risk for lung cancer--people age 48 and older who are or were heavy smokers. For most people, who aren't at increased risk, screening for lung cancer probably isn't helpful. Screening won't prevent cancer. And it may not find all lung cancers. Lung cancer screening may lower the risk of dying from lung cancer in a small number of people. How is it done? Lung cancer screening is done with a low-dose CT (computed tomography) scan. A CT scan uses X-rays, or radiation, to make detailed pictures of your body. Experts recommend that screening be done in medical centers that focus on finding and treating lung cancer. Who is screening recommended for? Lung cancer screening is recommended for people age 48 and older who are or were heavy smokers. That means people with a smoking history of at least 20 pack years. A pack year is a way to measure how heavy a smoker you are or were. To figure out your pack years, multiply how many packs a day on average (assuming 20 cigarettes per pack) you have smoked by how many years you have smoked. For example: If you smoked 1 pack a day for 20 years, that's 1 times 20. So you have a smoking history of 20 pack years. If you smoked 2 packs a day for 10 years, that's 2 times 10. So you have a smoking history of 20 pack years. Experts agree that screening is for people who have a high risk of lung cancer. But experts don't agree on what high risk means. Some say people age 48 or older with at least a 20-pack-year smoking history are high risk. Others say it's people age 54 or older with a 30-pack-year history. To see if you could benefit from screening, first find out if you are at high risk for lung cancer. Your doctor can help you decide your lung cancer risk. What are the risks of screening? CT screening for lung cancer isn't perfect. It can show an abnormal result when it turns out there wasn't any cancer. This is called a false-positive result. This means you may need more tests to make sure you don't have cancer. These tests can be harmful and cause a lot of worry. These tests may include more CT scans and invasive testing like a lung biopsy. In a biopsy, the doctor takes a sample of tissue from inside your lung so it can be looked at under a microscope. A biopsy is the only way to tell if you have lung cancer. If the biopsy finds cancer, you and your doctor will have to decide how or whether to treat it. Some lung cancers found on CT scans are harmless and would not have caused a problem if they had not been found through screening. But because doctors can't tell which ones will turn out to be harmless, most will be treated. This means that you may get treatment--including surgery, radiation, or chemotherapy--that you don't need. There is a risk of damage to cells or tissue from being exposed to radiation, including the small amounts used in CTs, X-rays, and other medical tests. Over time, exposure to radiation may cause cancer and other health problems. But in most cases, the risk of getting cancer from being exposed to small amounts of radiation is low. It's not a reason to avoid these tests for most people. What are the benefits of screening? Your scan may be normal (negative). For some people who are at higher risk, screening lowers the chance of dying of lung cancer. How much and how long you smoked helps to determine your risk level. Screening can find some cancers early, when treatment may be more likely to work.   What happens after screening? The results of your CT scan will be sent to your doctor. Someone from your care team will explain the results of your scan and answer any questions you may have. If you need any follow-up, he or she will help you understand what to do next. After a lung cancer screening, you can go back to your usual activities right away. A lung cancer screening test can't tell if you have lung cancer. If your results are positive, your doctor can't tell whether an abnormal finding is a harmless nodule, cancer, or something else without doing more tests. What can you do to help prevent lung cancer? Some lung cancers can't be prevented. But if you smoke, quitting smoking is the best step you can take to prevent lung cancer. If you want to quit, your doctor can recommend medicines or other ways to help. Follow-up care is a key part of your treatment and safety. Be sure to make and go to all appointments, and call your doctor if you are having problems. It's also a good idea to know your test results and keep a list of the medicines you take. Where can you learn more? Go to https://MashMe.TVpecdream network.Find Invest Grow (FIG). org and sign in to your [employer] account. Enter S412 in the Boomrat box to learn more about \"Learning About Lung Cancer Screening. \"     If you do not have an account, please click on the \"Sign Up Now\" link. Current as of: May 4, 2022               Content Version: 13.4  © 5846-4707 Healthwise, Incorporated. Care instructions adapted under license by [facility] ([facility]). If you have questions about a medical condition or this instruction, always ask your healthcare professional. [address] any warranty or liability for your use of this information.

## 2022-11-25 ENCOUNTER — NURSE ONLY (OUTPATIENT)
Dept: LAB | Age: 57
End: 2022-11-25

## 2022-11-25 DIAGNOSIS — E11.9 TYPE 2 DIABETES MELLITUS WITHOUT COMPLICATION, WITHOUT LONG-TERM CURRENT USE OF INSULIN (HCC): ICD-10-CM

## 2022-11-25 LAB
AVERAGE GLUCOSE: 114 MG/DL (ref 70–126)
HBA1C MFR BLD: 5.8 % (ref 4.4–6.4)

## 2022-11-28 DIAGNOSIS — E11.9 TYPE 2 DIABETES MELLITUS WITHOUT COMPLICATION, WITHOUT LONG-TERM CURRENT USE OF INSULIN (HCC): Primary | ICD-10-CM

## 2022-12-20 ENCOUNTER — HOSPITAL ENCOUNTER (OUTPATIENT)
Dept: CT IMAGING | Age: 57
Discharge: HOME | End: 2022-12-20
Payer: COMMERCIAL

## 2022-12-20 DIAGNOSIS — Z87.891 PERSONAL HISTORY OF TOBACCO USE: ICD-10-CM

## 2022-12-20 PROCEDURE — 71271 CT THORAX LUNG CANCER SCR C-: CPT

## 2022-12-22 ENCOUNTER — TELEPHONE (OUTPATIENT)
Dept: FAMILY MEDICINE CLINIC | Age: 57
End: 2022-12-22

## 2022-12-22 NOTE — TELEPHONE ENCOUNTER
----- Message from Joyce Mari sent at 12/22/2022  2:45 PM EST -----  Subject: Refill Request    QUESTIONS  Name of Medication? Other - OZEMPIC  Patient-reported dosage and instructions? Inject 2 mg into the skin twice   daily  How many days do you have left? 0  [id number]  Pharmacy phone number (if available)? [phone]  Additional Information for Provider? Patient spouse would like if clinical   staff could give her a call back  ---------------------------------------------------------------------------  --------------  [address]  What is the best way for the office to contact you? OK to leave message on   voicemail  Preferred Call Back Phone Number? [phone]  ---------------------------------------------------------------------------  --------------  SCRIPT ANSWERS  Relationship to Patient? Other  Representative Name? Isi Serrano  Is the Representative on the appropriate HIPAA document in Epic?  Yes

## 2022-12-22 NOTE — TELEPHONE ENCOUNTER
Patient wife called asking if we have received anything from the pharmacy faxed over for the script of [address]. I told her I did not see anything yet and provided her the fax number to have them resend it. Please advise.

## 2022-12-27 ENCOUNTER — TELEPHONE (OUTPATIENT)
Dept: FAMILY MEDICINE CLINIC | Age: 57
End: 2022-12-27

## 2022-12-27 DIAGNOSIS — E11.9 TYPE 2 DIABETES MELLITUS WITHOUT COMPLICATION, WITHOUT LONG-TERM CURRENT USE OF INSULIN (HCC): ICD-10-CM

## 2022-12-27 NOTE — TELEPHONE ENCOUNTER
I can only send in my name. If he wants it in Dr. Rowan Salazar name, he will have to wait until after the first of the year. Earlier I did send it to [facility] as that is who requested it. If he wants it sent to [place] in my name send this message back to me.   Thank you

## 2022-12-27 NOTE — TELEPHONE ENCOUNTER
Patient's last appointment was : Visit date not found  Patient's next appointment is : 3/27/23  Future Appointments   Date Time Provider Kit Palacios   3/27/2023 10:00 AM Dalia Tong MD [facility]     Last refilled:    Lab Results   Component Value Date    LABA1C 5.8 11/25/2022     Lab Results   Component Value Date    CHOL 126 05/05/2021    TRIG 183 05/05/2021    HDL 30 06/20/2022    LDLCALC 59 06/20/2022     Lab Results   Component Value Date     06/20/2022    K 4.0 06/20/2022    CL 97 (L) 06/20/2022    CO2 28 06/20/2022    BUN 12 06/20/2022    CREATININE 0.6 06/20/2022    GLUCOSE 193 (H) 06/20/2022    CALCIUM 9.2 06/20/2022    PROT 7.4 06/20/2022    LABALBU 4.7 06/20/2022    BILITOT 0.3 06/20/2022    ALKPHOS 131 (H) 06/20/2022    AST 32 06/20/2022    ALT 65 06/20/2022    LABGLOM >90 06/20/2022     Lab Results   Component Value Date    TSH 1.920 05/05/2021     Lab Results   Component Value Date    WBC 9.6 06/20/2022    HGB 14.2 06/20/2022    HCT 45.6 06/20/2022    MCV 95.6 (H) 06/20/2022     06/20/2022

## 2022-12-27 NOTE — TELEPHONE ENCOUNTER
Pt came in about the ozempic prescription. Wants to make sure it's Dr. Ayo Keller name on the new prescription and that it goes to [place] [place] on [address]. Pt said that he was informed by the pharmacy that Dr. Oscar De La Cruz might need to call the pharmacy to verify that two shots of the 1 mg are okay vs the 2 mg. Request is already in.

## 2023-01-03 NOTE — TELEPHONE ENCOUNTER
Patient's last appointment was : 11/23/22  Patient's next appointment is : 3/27/23  Future Appointments   Date Time Provider Department Center   3/27/2023 10:00 AM Nilda Hunt MD [facility]     Last refilled:6/3/22    Lab Results   Component Value Date    LABA1C 5.8 11/25/2022     Lab Results   Component Value Date    CHOL 126 05/05/2021    TRIG 183 05/05/2021    HDL 30 06/20/2022    LDLCALC 59 06/20/2022     Lab Results   Component Value Date     06/20/2022    K 4.0 06/20/2022    CL 97 (L) 06/20/2022    CO2 28 06/20/2022    BUN 12 06/20/2022    CREATININE 0.6 06/20/2022    GLUCOSE 193 (H) 06/20/2022    CALCIUM 9.2 06/20/2022    PROT 7.4 06/20/2022    LABALBU 4.7 06/20/2022    BILITOT 0.3 06/20/2022    ALKPHOS 131 (H) 06/20/2022    AST 32 06/20/2022    ALT 65 06/20/2022    LABGLOM >90 06/20/2022     Lab Results   Component Value Date    TSH 1.920 05/05/2021     Lab Results   Component Value Date    WBC 9.6 06/20/2022    HGB 14.2 06/20/2022    HCT 45.6 06/20/2022    MCV 95.6 (H) 06/20/2022     06/20/2022

## 2023-01-09 ENCOUNTER — TELEPHONE (OUTPATIENT)
Dept: FAMILY MEDICINE CLINIC | Age: 58
End: 2023-01-09

## 2023-01-09 ENCOUNTER — TELEMEDICINE (OUTPATIENT)
Dept: FAMILY MEDICINE CLINIC | Age: 58
End: 2023-01-09
Payer: COMMERCIAL

## 2023-01-09 DIAGNOSIS — U07.1 COVID: Primary | ICD-10-CM

## 2023-01-09 DIAGNOSIS — Z86.73 HISTORY OF CVA (CEREBROVASCULAR ACCIDENT): ICD-10-CM

## 2023-01-09 DIAGNOSIS — E11.9 TYPE 2 DIABETES MELLITUS WITHOUT COMPLICATION, WITHOUT LONG-TERM CURRENT USE OF INSULIN (HCC): ICD-10-CM

## 2023-01-09 PROCEDURE — 3017F COLORECTAL CA SCREEN DOC REV: CPT | Performed by: FAMILY MEDICINE

## 2023-01-09 PROCEDURE — 99212 OFFICE O/P EST SF 10 MIN: CPT | Performed by: FAMILY MEDICINE

## 2023-01-09 PROCEDURE — 3046F HEMOGLOBIN A1C LEVEL >9.0%: CPT | Performed by: FAMILY MEDICINE

## 2023-01-09 PROCEDURE — G8428 CUR MEDS NOT DOCUMENT: HCPCS | Performed by: FAMILY MEDICINE

## 2023-01-09 PROCEDURE — 2022F DILAT RTA XM EVC RTNOPTHY: CPT | Performed by: FAMILY MEDICINE

## 2023-01-09 RX ORDER — FLUTICASONE PROPIONATE 50 MCG
1 SPRAY, SUSPENSION (ML) NASAL DAILY
Qty: 16 G | Refills: 0 | Status: SHIPPED | OUTPATIENT
Start: 2023-01-09

## 2023-01-09 RX ORDER — BENZONATATE 200 MG/1
200 CAPSULE ORAL 3 TIMES DAILY PRN
Qty: 30 CAPSULE | Refills: 0 | Status: SHIPPED | OUTPATIENT
Start: 2023-01-09 | End: 2023-01-16

## 2023-01-09 NOTE — TELEPHONE ENCOUNTER
Patient called  he just tested positive for covid ,wanted to know if there is something they can take or if she can call something in for them.

## 2023-01-09 NOTE — PROGRESS NOTES
2023    TELEHEALTH EVALUATION -- Audio/Visual (During LSVRY-09 public health emergency)    HPI:    Elli Guadarrama (:  1965) has requested an audio/video evaluation for the following concern(s): concern about Covid    Symptoms started 4-5 days ago with headache, then chest cold and congestion, scratchy throat, runny nose, sneezing and dry cough. His temperature was 99.4 yesterday. He was very tired and slept most of the day. Denies any nausea, vomiting or diarrhea. Home test for Covid was positive. He has just one Covid vaccine back in 2021. Prior to Visit Medications    Medication Sig Taking? Authorizing Provider   nirmatrelvir/ritonavir (PAXLOVID) 20 x 150 MG & 10 x 100MG TBPK Take 3 tablets (two 150 mg nirmatrelvir and one 100 mg ritonavir tablets) by mouth every 12 hours for 5 days.  Yes Amado Augustine MD   fluticasone (FLONASE) 50 MCG/ACT nasal spray 1 spray by Each Nostril route daily Yes Amado Augustine MD   benzonatate (TESSALON) 200 MG capsule Take 1 capsule by mouth 3 times daily as needed for Cough Yes Amado Augustine MD   metFORMIN (GLUCOPHAGE) 1000 MG tablet Take 1 tablet by mouth 2 times daily (with meals)  Amado Augustine MD   Semaglutide, 1 MG/DOSE, 2 MG/1.5ML SOPN Inject 2 mg into the skin once a week  Sharan Mauricio MD   atorvastatin (LIPITOR) 20 MG tablet Take 1 tablet by mouth daily  TIMOTHY Hackett CNP   sertraline (ZOLOFT) 50 MG tablet Take 1 tablet by mouth daily  TIMOTHY Hackett CNP   Omega-3 Fatty Acids (FISH OIL) 1000 MG capsule Take 1,000 mg by mouth daily  Historical Provider, MD   ACCU-CHEK MULTICLIX LANCETS MISC Use as directed  Amado Augustine MD   blood glucose test strips (ASCENSIA AUTODISC VI;ONE TOUCH ULTRA TEST VI) strip 1 each by In Vitro route daily Diagnosis E11.9  Amado Augustine MD   Cholecalciferol (VITAMIN D) 2000 units CAPS capsule Take 1 capsule by mouth daily  Amado Augustine MD   calcium carbonate-vitamin D3 (CALCIUM 600/VITAMIN D) 600-400 MG-UNIT TABS Take 1 tablet by mouth 2 times daily  Elizabeth Billings, APRN - CNP   Multiple Vitamins-Minerals (THERAPEUTIC MULTIVITAMIN-MINERALS) tablet Take 1 tablet by mouth daily  Historical Provider, MD   Cinnamon 500 MG CAPS Take 1 capsule by mouth 3 times daily (with meals)  Patient taking differently: Take 6 capsules by mouth daily  Brandie Pineda DO   Blood Glucose Monitoring Suppl (ONE TOUCH BASIC SYSTEM) W/DEVICE KIT 1 kit by Does not apply route daily E11.9 test daily. Brandie Pineda DO   aspirin 325 MG tablet Take 325 mg by mouth daily  Historical Provider, MD       Social History     Tobacco Use    Smoking status: Former     Packs/day: 1.50     Years: 30.00     Pack years: 45.00     Types: Cigarettes     Start date: 1974     Quit date: 3/1/2011     Years since quittin.8    Smokeless tobacco: Never   Substance Use Topics    Alcohol use: No    Drug use: No        Past Medical History:   Diagnosis Date    Diabetes mellitus ([facility] [address]     Major depression 2014    Obesity (BMI 30-39.9) 2014    PFO with atrial septal aneurysm 2014    Repaired at [address] [place] [address] in 2014    Stroke [facility]) 03/10/2014    Unspecified cerebral artery occlusion with cerebral infarction        PHYSICAL EXAMINATION:    Constitutional: [x] Appears well-developed and well-nourished [x] No apparent distress        Mental status  [x] Alert and awake  [x] Oriented to person/place/time [x]Able to follow commands      Eyes:  EOM    [x]  Normal     HENT:   [x] Normocephalic, atraumatic. External Ears [x] Normal  [] Abnormal-     Neck: [x] No visualized mass     Pulmonary/Chest: [x] Respiratory effort normal.  [x] No visualized signs of difficulty breathing or respiratory distress                     Psychiatric:       [x] Normal Affect      ASSESSMENT/PLAN:    1. COVID  Paxlovid as indicated (Normal GFR)  Tesalon pearls  Drink plenty of water  Flonase nasal spray  Hold Lipitor while on Paxlovid    2.  Type 2 diabetes mellitus without complication, without long-term current use of insulin ([facility] [address]    3. History of CVA (cerebrovascular accident)    4. BMI 36.0-36.9,adult    Return in about 3 days (around 1/12/2023). Scotty Lujan, was evaluated through a synchronous (real-time) audio-video encounter. The patient (or guardian if applicable) is aware that this is a billable service, which includes applicable co-pays. This Virtual Visit was conducted with patient's (and/or legal guardian's) consent. The visit was conducted pursuant to the emergency declaration under the [facility], [address] [place] [address] authority and the [employer] and [employer] General Act. Patient identification was verified, and a caregiver was present when appropriate. The patient was located at Home: [address] [place] [address]. Provider was located at [address]): [address] [place] [address] FAYE ZAPATA II.HUONG,  [address]. Total time spent on this encounter:  15 minutes    --Aj Guzman MD on 1/9/2023 at 3:08 PM    An electronic signature was used to authenticate this note.

## 2023-01-18 DIAGNOSIS — E11.9 TYPE 2 DIABETES MELLITUS WITHOUT COMPLICATION, WITHOUT LONG-TERM CURRENT USE OF INSULIN (HCC): ICD-10-CM

## 2023-01-18 NOTE — TELEPHONE ENCOUNTER
Patient's last appointment was : 1/9/2023  Patient's next appointment is :  3/27/2023  Last refilled: 12/27/2022    Lab Results   Component Value Date    LABA1C 5.8 11/25/2022     Lab Results   Component Value Date    CHOL 126 05/05/2021    TRIG 183 05/05/2021    HDL 30 06/20/2022    LDLCALC 59 06/20/2022     Lab Results   Component Value Date     06/20/2022    K 4.0 06/20/2022    CL 97 (L) 06/20/2022    CO2 28 06/20/2022    BUN 12 06/20/2022    CREATININE 0.6 06/20/2022    GLUCOSE 193 (H) 06/20/2022    CALCIUM 9.2 06/20/2022    PROT 7.4 06/20/2022    LABALBU 4.7 06/20/2022    BILITOT 0.3 06/20/2022    ALKPHOS 131 (H) 06/20/2022    AST 32 06/20/2022    ALT 65 06/20/2022    LABGLOM >90 06/20/2022     Lab Results   Component Value Date    TSH 1.920 05/05/2021     Lab Results   Component Value Date    WBC 9.6 06/20/2022    HGB 14.2 06/20/2022    HCT 45.6 06/20/2022    MCV 95.6 (H) 06/20/2022     06/20/2022

## 2023-03-07 RX ORDER — FLUTICASONE PROPIONATE 50 MCG
1 SPRAY, SUSPENSION (ML) NASAL DAILY
Qty: 16 G | Refills: 0 | Status: SHIPPED | OUTPATIENT
Start: 2023-03-07

## 2023-03-07 NOTE — TELEPHONE ENCOUNTER
Patient's last appointment was : 1/9/2023  Patient's next appointment is :  3/27/2023  Last refilled: 1/9/2023   Pharmacy Verified    Lab Results   Component Value Date    LABA1C 5.8 11/25/2022     Lab Results   Component Value Date    CHOL 126 05/05/2021    TRIG 183 05/05/2021    HDL 30 06/20/2022    LDLCALC 59 06/20/2022     Lab Results   Component Value Date     06/20/2022    K 4.0 06/20/2022    CL 97 (L) 06/20/2022    CO2 28 06/20/2022    BUN 12 06/20/2022    CREATININE 0.6 06/20/2022    GLUCOSE 193 (H) 06/20/2022    CALCIUM 9.2 06/20/2022    PROT 7.4 06/20/2022    LABALBU 4.7 06/20/2022    BILITOT 0.3 06/20/2022    ALKPHOS 131 (H) 06/20/2022    AST 32 06/20/2022    ALT 65 06/20/2022    LABGLOM >90 06/20/2022     Lab Results   Component Value Date    TSH 1.920 05/05/2021     Lab Results   Component Value Date    WBC 9.6 06/20/2022    HGB 14.2 06/20/2022    HCT 45.6 06/20/2022    MCV 95.6 (H) 06/20/2022     06/20/2022

## 2023-03-27 ENCOUNTER — OFFICE VISIT (OUTPATIENT)
Dept: FAMILY MEDICINE CLINIC | Age: 58
End: 2023-03-27
Payer: COMMERCIAL

## 2023-03-27 VITALS
HEIGHT: 71 IN | DIASTOLIC BLOOD PRESSURE: 70 MMHG | SYSTOLIC BLOOD PRESSURE: 102 MMHG | WEIGHT: 243 LBS | TEMPERATURE: 97.2 F | RESPIRATION RATE: 18 BRPM | BODY MASS INDEX: 34.02 KG/M2 | OXYGEN SATURATION: 97 % | HEART RATE: 88 BPM

## 2023-03-27 DIAGNOSIS — E78.00 HYPERCHOLESTEROLEMIA: ICD-10-CM

## 2023-03-27 DIAGNOSIS — E55.9 VITAMIN D DEFICIENCY: ICD-10-CM

## 2023-03-27 DIAGNOSIS — E11.9 TYPE 2 DIABETES MELLITUS WITHOUT COMPLICATION, WITHOUT LONG-TERM CURRENT USE OF INSULIN (HCC): Primary | ICD-10-CM

## 2023-03-27 DIAGNOSIS — Z86.73 HISTORY OF CVA (CEREBROVASCULAR ACCIDENT): ICD-10-CM

## 2023-03-27 DIAGNOSIS — E78.6 LOW HDL (UNDER 40): ICD-10-CM

## 2023-03-27 LAB — HBA1C MFR BLD: 5.3 % (ref 4.3–5.7)

## 2023-03-27 PROCEDURE — 2022F DILAT RTA XM EVC RTNOPTHY: CPT | Performed by: FAMILY MEDICINE

## 2023-03-27 PROCEDURE — 1036F TOBACCO NON-USER: CPT | Performed by: FAMILY MEDICINE

## 2023-03-27 PROCEDURE — G8482 FLU IMMUNIZE ORDER/ADMIN: HCPCS | Performed by: FAMILY MEDICINE

## 2023-03-27 PROCEDURE — 3017F COLORECTAL CA SCREEN DOC REV: CPT | Performed by: FAMILY MEDICINE

## 2023-03-27 PROCEDURE — G8427 DOCREV CUR MEDS BY ELIG CLIN: HCPCS | Performed by: FAMILY MEDICINE

## 2023-03-27 PROCEDURE — 3044F HG A1C LEVEL LT 7.0%: CPT | Performed by: FAMILY MEDICINE

## 2023-03-27 PROCEDURE — G8417 CALC BMI ABV UP PARAM F/U: HCPCS | Performed by: FAMILY MEDICINE

## 2023-03-27 PROCEDURE — 99214 OFFICE O/P EST MOD 30 MIN: CPT | Performed by: FAMILY MEDICINE

## 2023-03-27 SDOH — ECONOMIC STABILITY: INCOME INSECURITY: HOW HARD IS IT FOR YOU TO PAY FOR THE VERY BASICS LIKE FOOD, HOUSING, MEDICAL CARE, AND HEATING?: NOT HARD AT ALL

## 2023-03-27 SDOH — ECONOMIC STABILITY: FOOD INSECURITY: WITHIN THE PAST 12 MONTHS, YOU WORRIED THAT YOUR FOOD WOULD RUN OUT BEFORE YOU GOT MONEY TO BUY MORE.: NEVER TRUE

## 2023-03-27 SDOH — ECONOMIC STABILITY: HOUSING INSECURITY
IN THE LAST 12 MONTHS, WAS THERE A TIME WHEN YOU DID NOT HAVE A STEADY PLACE TO SLEEP OR SLEPT IN A SHELTER (INCLUDING NOW)?: NO

## 2023-03-27 SDOH — ECONOMIC STABILITY: FOOD INSECURITY: WITHIN THE PAST 12 MONTHS, THE FOOD YOU BOUGHT JUST DIDN'T LAST AND YOU DIDN'T HAVE MONEY TO GET MORE.: NEVER TRUE

## 2023-03-27 ASSESSMENT — PATIENT HEALTH QUESTIONNAIRE - PHQ9
4. FEELING TIRED OR HAVING LITTLE ENERGY: 0
3. TROUBLE FALLING OR STAYING ASLEEP: 0
7. TROUBLE CONCENTRATING ON THINGS, SUCH AS READING THE NEWSPAPER OR WATCHING TELEVISION: 0
6. FEELING BAD ABOUT YOURSELF - OR THAT YOU ARE A FAILURE OR HAVE LET YOURSELF OR YOUR FAMILY DOWN: 0
8. MOVING OR SPEAKING SO SLOWLY THAT OTHER PEOPLE COULD HAVE NOTICED. OR THE OPPOSITE, BEING SO FIGETY OR RESTLESS THAT YOU HAVE BEEN MOVING AROUND A LOT MORE THAN USUAL: 0
SUM OF ALL RESPONSES TO PHQ QUESTIONS 1-9: 0
9. THOUGHTS THAT YOU WOULD BE BETTER OFF DEAD, OR OF HURTING YOURSELF: 0
SUM OF ALL RESPONSES TO PHQ9 QUESTIONS 1 & 2: 0
5. POOR APPETITE OR OVEREATING: 0
1. LITTLE INTEREST OR PLEASURE IN DOING THINGS: 0
SUM OF ALL RESPONSES TO PHQ QUESTIONS 1-9: 0
10. IF YOU CHECKED OFF ANY PROBLEMS, HOW DIFFICULT HAVE THESE PROBLEMS MADE IT FOR YOU TO DO YOUR WORK, TAKE CARE OF THINGS AT HOME, OR GET ALONG WITH OTHER PEOPLE: 0
SUM OF ALL RESPONSES TO PHQ QUESTIONS 1-9: 0
2. FEELING DOWN, DEPRESSED OR HOPELESS: 0
SUM OF ALL RESPONSES TO PHQ QUESTIONS 1-9: 0

## 2023-03-27 NOTE — PROGRESS NOTES
right to left shunting. He underwent successful closure of the PFO using ICE-guided placement of a  25 mm Amplatzer occluder device on 11/17/14. He could not be on Coumadin due to his past CNS issues. Hypercoagulable workup was negative. Since then, he has been on   mg 1 tablet PO daily and Lipitor 20 mg 1 tablet PO daily. He was able to return to work after he had his PFO closed in November 20, 2014    History of smoking. Lung cancer screening performed in November 2022 showed no suspicious masses or nodules within the lung fields, no pathologically enlarged lymph nodes, lung RADS assessment value of 1. Repeat screening in 1 year. Significant Past Medical History:    Past Medical History:   Diagnosis Date    Diabetes mellitus ([address]     Major depression 12/08/2014    Obesity (BMI 30-39.9) 12/08/2014    PFO with atrial septal aneurysm 2014    Repaired at [facility] in November 2014    Stroke [facility]) 03/10/2014    Unspecified cerebral artery occlusion with cerebral infarction            Allergies:  is allergic to shellfish-derived products.     Medications:   Current Outpatient Medications   Medication Sig Dispense Refill    Semaglutide, 1 MG/DOSE, 2 MG/1.5ML SOPN Inject 2 mg into the skin once a week 12 Adjustable Dose Pre-filled Pen Syringe 1    metFORMIN (GLUCOPHAGE) 1000 MG tablet Take 1 tablet by mouth 2 times daily (with meals) 180 tablet 1    atorvastatin (LIPITOR) 20 MG tablet Take 1 tablet by mouth daily 90 tablet 1    sertraline (ZOLOFT) 50 MG tablet Take 1 tablet by mouth daily 90 tablet 1    Omega-3 Fatty Acids (FISH OIL) 1000 MG capsule Take 1,000 mg by mouth daily      ACCU-CHEK MULTICLIX LANCETS MISC Use as directed 100 each 3    blood glucose test strips (ASCENSIA AUTODISC VI;ONE TOUCH ULTRA TEST VI) strip 1 each by In Vitro route daily Diagnosis E11.9 100 each 3    Cholecalciferol (VITAMIN D) 2000 units CAPS capsule Take 1 capsule by mouth daily 90 capsule 1    calcium carbonate-vitamin D3

## 2023-04-24 DIAGNOSIS — R45.4 DIFFICULTY CONTROLLING ANGER: ICD-10-CM

## 2023-07-06 NOTE — TELEPHONE ENCOUNTER
Patient's last appointment was : 3/27/2023  Patient's next appointment is :  9/25/2023  Last refilled: 1/   Pharmacy Verified    Lab Results   Component Value Date    LABA1C 5.3 03/27/2023     Lab Results   Component Value Date    CHOL 126 05/05/2021    TRIG 183 05/05/2021    HDL 30 06/20/2022    LDLCALC 59 06/20/2022     Lab Results   Component Value Date     06/20/2022    K 4.0 06/20/2022    CL 97 (L) 06/20/2022    CO2 28 06/20/2022    BUN 12 06/20/2022    CREATININE 0.6 06/20/2022    GLUCOSE 193 (H) 06/20/2022    CALCIUM 9.2 06/20/2022    PROT 7.4 06/20/2022    LABALBU 4.7 06/20/2022    BILITOT 0.3 06/20/2022    ALKPHOS 131 (H) 06/20/2022    AST 32 06/20/2022    ALT 65 06/20/2022    LABGLOM >90 06/20/2022     Lab Results   Component Value Date    TSH 1.920 05/05/2021     Lab Results   Component Value Date    WBC 9.6 06/20/2022    HGB 14.2 06/20/2022    HCT 45.6 06/20/2022    MCV 95.6 (H) 06/20/2022     06/20/2022

## 2023-08-03 ENCOUNTER — PATIENT MESSAGE (OUTPATIENT)
Dept: FAMILY MEDICINE CLINIC | Age: 58
End: 2023-08-03

## 2023-08-03 NOTE — TELEPHONE ENCOUNTER
From: Alexandre Antonio  To: Dr. Jeanna Paulson: 8/3/2023 11:48 AM EDT  Subject: Prescription refused    The pharmacy called and said that my prescription for atorvastatin 20 MG was denied. Am I not to take it anymore? Please advise.   Nicola Ontiveros

## 2023-08-03 NOTE — TELEPHONE ENCOUNTER
Patient's last appointment was : 3/27/2023  Patient's next appointment is :  9/25/2023  Last refilled: 5/2023   Pharmacy Verified    Lab Results   Component Value Date    LABA1C 5.3 03/27/2023     Lab Results   Component Value Date    CHOL 126 05/05/2021    TRIG 183 05/05/2021    HDL 30 06/20/2022    LDLCALC 59 06/20/2022     Lab Results   Component Value Date     06/20/2022    K 4.0 06/20/2022    CL 97 (L) 06/20/2022    CO2 28 06/20/2022    BUN 12 06/20/2022    CREATININE 0.6 06/20/2022    GLUCOSE 193 (H) 06/20/2022    CALCIUM 9.2 06/20/2022    PROT 7.4 06/20/2022    LABALBU 4.7 06/20/2022    BILITOT 0.3 06/20/2022    ALKPHOS 131 (H) 06/20/2022    AST 32 06/20/2022    ALT 65 06/20/2022    LABGLOM >90 06/20/2022     Lab Results   Component Value Date    TSH 1.920 05/05/2021     Lab Results   Component Value Date    WBC 9.6 06/20/2022    HGB 14.2 06/20/2022    HCT 45.6 06/20/2022    MCV 95.6 (H) 06/20/2022     06/20/2022

## 2023-08-04 DIAGNOSIS — Z86.73 HISTORY OF CVA (CEREBROVASCULAR ACCIDENT): ICD-10-CM

## 2023-08-04 RX ORDER — ATORVASTATIN CALCIUM 20 MG/1
20 TABLET, FILM COATED ORAL DAILY
Qty: 90 TABLET | Refills: 1 | Status: SHIPPED | OUTPATIENT
Start: 2023-08-04

## 2023-09-25 ENCOUNTER — NURSE ONLY (OUTPATIENT)
Dept: LAB | Age: 58
End: 2023-09-25

## 2023-09-25 ENCOUNTER — OFFICE VISIT (OUTPATIENT)
Dept: FAMILY MEDICINE CLINIC | Age: 58
End: 2023-09-25
Payer: COMMERCIAL

## 2023-09-25 VITALS
HEIGHT: 71 IN | BODY MASS INDEX: 36.12 KG/M2 | TEMPERATURE: 98.2 F | WEIGHT: 258 LBS | DIASTOLIC BLOOD PRESSURE: 70 MMHG | OXYGEN SATURATION: 96 % | HEART RATE: 75 BPM | SYSTOLIC BLOOD PRESSURE: 118 MMHG | RESPIRATION RATE: 18 BRPM

## 2023-09-25 DIAGNOSIS — R45.4 DIFFICULTY CONTROLLING ANGER: ICD-10-CM

## 2023-09-25 DIAGNOSIS — Z87.891 PERSONAL HISTORY OF TOBACCO USE: ICD-10-CM

## 2023-09-25 DIAGNOSIS — E11.9 TYPE 2 DIABETES MELLITUS WITHOUT COMPLICATION, WITHOUT LONG-TERM CURRENT USE OF INSULIN (HCC): ICD-10-CM

## 2023-09-25 DIAGNOSIS — E78.00 HYPERCHOLESTEROLEMIA: ICD-10-CM

## 2023-09-25 DIAGNOSIS — I69.90 LATE EFFECTS OF CVA (CEREBROVASCULAR ACCIDENT): ICD-10-CM

## 2023-09-25 DIAGNOSIS — Z23 IMMUNIZATION DUE: ICD-10-CM

## 2023-09-25 DIAGNOSIS — Z12.5 SCREENING FOR PROSTATE CANCER: ICD-10-CM

## 2023-09-25 DIAGNOSIS — E55.9 VITAMIN D DEFICIENCY: ICD-10-CM

## 2023-09-25 DIAGNOSIS — E11.9 TYPE 2 DIABETES MELLITUS WITHOUT COMPLICATION, WITHOUT LONG-TERM CURRENT USE OF INSULIN (HCC): Primary | ICD-10-CM

## 2023-09-25 LAB
ALBUMIN SERPL BCG-MCNC: 4.4 G/DL (ref 3.5–5.1)
ALP SERPL-CCNC: 116 U/L (ref 38–126)
ALT SERPL W/O P-5'-P-CCNC: 25 U/L (ref 11–66)
ANION GAP SERPL CALC-SCNC: 13 MEQ/L (ref 8–16)
AST SERPL-CCNC: 18 U/L (ref 5–40)
BACTERIA: ABNORMAL
BASOPHILS ABSOLUTE: 0 THOU/MM3 (ref 0–0.1)
BASOPHILS NFR BLD AUTO: 0.1 %
BILIRUB SERPL-MCNC: 0.4 MG/DL (ref 0.3–1.2)
BILIRUB UR QL STRIP: NEGATIVE
BUN SERPL-MCNC: 17 MG/DL (ref 7–22)
CALCIUM SERPL-MCNC: 9.1 MG/DL (ref 8.5–10.5)
CASTS #/AREA URNS LPF: ABNORMAL /LPF
CASTS #/AREA URNS LPF: ABNORMAL /LPF
CHARACTER UR: CLEAR
CHARCOAL URNS QL MICRO: ABNORMAL
CHLORIDE SERPL-SCNC: 105 MEQ/L (ref 98–111)
CHOLEST SERPL-MCNC: 124 MG/DL (ref 100–199)
CO2 SERPL-SCNC: 25 MEQ/L (ref 23–33)
COLOR UR: YELLOW
CREAT SERPL-MCNC: 0.8 MG/DL (ref 0.4–1.2)
CREAT UR-MCNC: 262.3 MG/DL
CRYSTALS URNS QL MICRO: ABNORMAL
DEPRECATED RDW RBC AUTO: 42.2 FL (ref 35–45)
EOSINOPHIL NFR BLD AUTO: 1 %
EOSINOPHILS ABSOLUTE: 0.1 THOU/MM3 (ref 0–0.4)
EPITHELIAL CELLS, UA: ABNORMAL /HPF
ERYTHROCYTE [DISTWIDTH] IN BLOOD BY AUTOMATED COUNT: 12.1 % (ref 11.5–14.5)
GFR SERPL CREATININE-BSD FRML MDRD: > 60 ML/MIN/1.73M2
GLUCOSE SERPL-MCNC: 95 MG/DL (ref 70–108)
GLUCOSE UR QL STRIP.AUTO: NEGATIVE MG/DL
HBA1C MFR BLD: 5.4 % (ref 4.3–5.7)
HCT VFR BLD AUTO: 45.6 % (ref 42–52)
HDLC SERPL-MCNC: 37 MG/DL
HGB BLD-MCNC: 14.7 GM/DL (ref 14–18)
HGB UR QL STRIP.AUTO: NEGATIVE
IMM GRANULOCYTES # BLD AUTO: 0.02 THOU/MM3 (ref 0–0.07)
IMM GRANULOCYTES NFR BLD AUTO: 0.2 %
KETONES UR QL STRIP.AUTO: ABNORMAL
LDLC SERPL CALC-MCNC: 68 MG/DL
LEUKOCYTE ESTERASE UR QL STRIP.AUTO: NEGATIVE
LYMPHOCYTES ABSOLUTE: 2.5 THOU/MM3 (ref 1–4.8)
LYMPHOCYTES NFR BLD AUTO: 28.3 %
MCH RBC QN AUTO: 30.4 PG (ref 26–33)
MCHC RBC AUTO-ENTMCNC: 32.2 GM/DL (ref 32.2–35.5)
MCV RBC AUTO: 94.4 FL (ref 80–94)
MICROALBUMIN UR-MCNC: 1.66 MG/DL
MICROALBUMIN/CREAT RATIO PNL UR: 6 MG/G (ref 0–30)
MONOCYTES ABSOLUTE: 0.5 THOU/MM3 (ref 0.4–1.3)
MONOCYTES NFR BLD AUTO: 6.3 %
NEUTROPHILS NFR BLD AUTO: 64.1 %
NITRITE UR QL STRIP.AUTO: NEGATIVE
NRBC BLD AUTO-RTO: 0 /100 WBC
PH UR STRIP.AUTO: 5.5 [PH] (ref 5–9)
PLATELET # BLD AUTO: 279 THOU/MM3 (ref 130–400)
PMV BLD AUTO: 10.3 FL (ref 9.4–12.4)
POTASSIUM SERPL-SCNC: 4.3 MEQ/L (ref 3.5–5.2)
PROT SERPL-MCNC: 7.2 G/DL (ref 6.1–8)
PROT UR STRIP.AUTO-MCNC: NEGATIVE MG/DL
PSA SERPL-MCNC: 0.33 NG/ML (ref 0–1)
RBC # BLD AUTO: 4.83 MILL/MM3 (ref 4.7–6.1)
RBC #/AREA URNS HPF: ABNORMAL /HPF
RENAL EPI CELLS #/AREA URNS HPF: ABNORMAL /[HPF]
SEGMENTED NEUTROPHILS ABSOLUTE COUNT: 5.6 THOU/MM3 (ref 1.8–7.7)
SODIUM SERPL-SCNC: 143 MEQ/L (ref 135–145)
SPECIFIC GRAVITY UA: > 1.03 (ref 1–1.03)
T4 FREE SERPL-MCNC: 1.16 NG/DL (ref 0.93–1.76)
TRIGL SERPL-MCNC: 96 MG/DL (ref 0–199)
TSH SERPL DL<=0.005 MIU/L-ACNC: 2.11 UIU/ML (ref 0.4–4.2)
UROBILINOGEN, URINE: 1 EU/DL (ref 0–1)
WBC # BLD AUTO: 8.7 THOU/MM3 (ref 4.8–10.8)
WBC #/AREA URNS HPF: ABNORMAL /HPF
YEAST LIKE FUNGI URNS QL MICRO: ABNORMAL

## 2023-09-25 PROCEDURE — 90674 CCIIV4 VAC NO PRSV 0.5 ML IM: CPT

## 2023-09-25 PROCEDURE — 99214 OFFICE O/P EST MOD 30 MIN: CPT

## 2023-09-25 PROCEDURE — 3017F COLORECTAL CA SCREEN DOC REV: CPT | Performed by: FAMILY MEDICINE

## 2023-09-25 PROCEDURE — 83036 HEMOGLOBIN GLYCOSYLATED A1C: CPT

## 2023-09-25 PROCEDURE — G8427 DOCREV CUR MEDS BY ELIG CLIN: HCPCS | Performed by: FAMILY MEDICINE

## 2023-09-25 PROCEDURE — 1036F TOBACCO NON-USER: CPT | Performed by: FAMILY MEDICINE

## 2023-09-25 PROCEDURE — 2022F DILAT RTA XM EVC RTNOPTHY: CPT | Performed by: FAMILY MEDICINE

## 2023-09-25 PROCEDURE — 3044F HG A1C LEVEL LT 7.0%: CPT | Performed by: FAMILY MEDICINE

## 2023-09-25 PROCEDURE — G8417 CALC BMI ABV UP PARAM F/U: HCPCS | Performed by: FAMILY MEDICINE

## 2023-09-25 PROCEDURE — 90471 IMMUNIZATION ADMIN: CPT

## 2023-09-26 ENCOUNTER — TELEPHONE (OUTPATIENT)
Dept: FAMILY MEDICINE CLINIC | Age: 58
End: 2023-09-26

## 2023-09-26 NOTE — TELEPHONE ENCOUNTER
----- Message from Louisa Argueta MD sent at 9/25/2023  3:38 PM EDT -----  Labs reviewed. WNL. Pt should follow up with Dr. Zacarias Kumar in 6 months. Thanks!

## 2023-10-02 RX ORDER — SEMAGLUTIDE 2.68 MG/ML
INJECTION, SOLUTION SUBCUTANEOUS
COMMUNITY
Start: 2023-08-20 | End: 2023-10-03

## 2023-10-03 ENCOUNTER — PATIENT MESSAGE (OUTPATIENT)
Dept: FAMILY MEDICINE CLINIC | Age: 58
End: 2023-10-03

## 2023-10-03 DIAGNOSIS — E11.9 TYPE 2 DIABETES MELLITUS WITHOUT COMPLICATION, WITHOUT LONG-TERM CURRENT USE OF INSULIN (HCC): ICD-10-CM

## 2023-10-04 ENCOUNTER — TELEPHONE (OUTPATIENT)
Dept: FAMILY MEDICINE CLINIC | Age: 58
End: 2023-10-04

## 2023-10-04 NOTE — TELEPHONE ENCOUNTER
Joey called to verify patients dosage and quantity of Ozempic. Is patient to use 1mg or 2mg? And what is the quantity ordered? Please clarify with Katie @ [phone].

## 2023-10-04 NOTE — TELEPHONE ENCOUNTER
Patient's last appointment was : 9/25/2023 with our Jennifer Cheng  Patient's next appointment is : 10/4/2023 with our Dr. Marilyn Moffett  Last refilled on: 7/6/2023  Which pharmacy does the script need sent to: [facility]      Lab Results   Component Value Date    LABA1C 5.4 09/25/2023     Lab Results   Component Value Date    CHOL 124 09/25/2023    TRIG 96 09/25/2023    HDL 37 09/25/2023    LDLCALC 68 09/25/2023     Lab Results   Component Value Date     09/25/2023    K 4.3 09/25/2023     09/25/2023    CO2 25 09/25/2023    BUN 17 09/25/2023    CREATININE 0.8 09/25/2023    GLUCOSE 95 09/25/2023    CALCIUM 9.1 09/25/2023    PROT 7.2 09/25/2023    LABALBU 4.4 09/25/2023    BILITOT 0.4 09/25/2023    ALKPHOS 116 09/25/2023    AST 18 09/25/2023    ALT 25 09/25/2023    LABGLOM >60 09/25/2023     Lab Results   Component Value Date    TSH 2.110 09/25/2023    T4FREE 1.16 09/25/2023     Lab Results   Component Value Date    WBC 8.7 09/25/2023    HGB 14.7 09/25/2023    HCT 45.6 09/25/2023    MCV 94.4 (H) 09/25/2023     09/25/2023

## 2023-10-09 NOTE — TELEPHONE ENCOUNTER
Joey called again asking about the dosage on this medication as they got no response.  Please advise

## 2023-10-10 DIAGNOSIS — E11.9 TYPE 2 DIABETES MELLITUS WITHOUT COMPLICATION, WITHOUT LONG-TERM CURRENT USE OF INSULIN (HCC): ICD-10-CM

## 2023-10-10 RX ORDER — SEMAGLUTIDE 2.68 MG/ML
2 INJECTION, SOLUTION SUBCUTANEOUS WEEKLY
Qty: 12 ML | Refills: 1 | Status: SHIPPED | OUTPATIENT
Start: 2023-10-10 | End: 2024-01-08

## 2023-10-11 ENCOUNTER — PATIENT MESSAGE (OUTPATIENT)
Dept: FAMILY MEDICINE CLINIC | Age: 58
End: 2023-10-11

## 2023-11-08 DIAGNOSIS — Z86.73 HISTORY OF CVA (CEREBROVASCULAR ACCIDENT): ICD-10-CM

## 2023-11-09 NOTE — TELEPHONE ENCOUNTER
Patient's last appointment was : 9/25/2023 with our Dolly Martin  Patient's next appointment is : 3/25/2024 with our Dr. Estephania Hudson  Last refilled on: 11/3/2023  Which pharmacy does the script need sent to:  [id number]      Lab Results   Component Value Date    LABA1C 5.4 09/25/2023     Lab Results   Component Value Date    CHOL 124 09/25/2023    TRIG 96 09/25/2023    HDL 37 09/25/2023    LDLCALC 68 09/25/2023     Lab Results   Component Value Date     09/25/2023    K 4.3 09/25/2023     09/25/2023    CO2 25 09/25/2023    BUN 17 09/25/2023    CREATININE 0.8 09/25/2023    GLUCOSE 95 09/25/2023    CALCIUM 9.1 09/25/2023    PROT 7.2 09/25/2023    LABALBU 4.4 09/25/2023    BILITOT 0.4 09/25/2023    ALKPHOS 116 09/25/2023    AST 18 09/25/2023    ALT 25 09/25/2023    LABGLOM >60 09/25/2023     Lab Results   Component Value Date    TSH 2.110 09/25/2023    T4FREE 1.16 09/25/2023     Lab Results   Component Value Date    WBC 8.7 09/25/2023    HGB 14.7 09/25/2023    HCT 45.6 09/25/2023    MCV 94.4 (H) 09/25/2023     09/25/2023

## 2023-11-10 RX ORDER — ATORVASTATIN CALCIUM 20 MG/1
20 TABLET, FILM COATED ORAL DAILY
Qty: 90 TABLET | Refills: 1 | Status: SHIPPED | OUTPATIENT
Start: 2023-11-10

## 2024-01-03 NOTE — TELEPHONE ENCOUNTER
Patient's last appointment was : 9/25/2023 with our   Patient's next appointment is : 3/25/2024 with our Dr. Hunt  Last refilled on: 10/4/2023  Which [facility] does the script need sent [facility]: [facility]      Lab Results   Component Value Date    LABA1C 5.4 09/25/2023     Lab Results   Component Value Date    CHOL 124 09/25/2023    TRIG 96 09/25/2023    HDL 37 09/25/2023    LDLCALC 68 09/25/2023     Lab Results   Component Value Date     09/25/2023    K 4.3 09/25/2023     09/25/2023    CO2 25 09/25/2023    BUN 17 09/25/2023    CREATININE 0.8 09/25/2023    GLUCOSE 95 09/25/2023    CALCIUM 9.1 09/25/2023    PROT 7.2 09/25/2023    LABALBU 4.4 09/25/2023    BILITOT 0.4 09/25/2023    ALKPHOS 116 09/25/2023    AST 18 09/25/2023    ALT 25 09/25/2023    LABGLOM >60 09/25/2023     Lab Results   Component Value Date    TSH 2.110 09/25/2023    T4FREE 1.16 09/25/2023     Lab Results   Component Value Date    WBC 8.7 09/25/2023    HGB 14.7 09/25/2023    HCT 45.6 09/25/2023    MCV 94.4 (H) 09/25/2023     09/25/2023

## 2024-01-24 ENCOUNTER — PATIENT MESSAGE (OUTPATIENT)
Dept: FAMILY MEDICINE CLINIC | Age: 59
End: 2024-01-24

## 2024-01-24 NOTE — TELEPHONE ENCOUNTER
From: Danny Antonio  To: Dr. Nilda Hunt  Sent: 1/24/2024 12:28 PM EST  Subject: Change in prescription.    Dr. Hunt the rebelsus is not working. I am up to 285 lbs. I would like to go back on the ozempic. Would you please put me back on it. Thank you Karlo Antonio

## 2024-01-25 RX ORDER — ORAL SEMAGLUTIDE 14 MG/1
1 TABLET ORAL DAILY
Qty: 30 TABLET | Refills: 1 | Status: SHIPPED | OUTPATIENT
Start: 2024-01-25

## 2024-02-05 DIAGNOSIS — Z86.73 HISTORY OF CVA (CEREBROVASCULAR ACCIDENT): ICD-10-CM

## 2024-02-05 NOTE — TELEPHONE ENCOUNTER
This medication refill is regarding a MyChart request. Refill requested by patient.    Requested Prescriptions     Pending Prescriptions Disp Refills    atorvastatin (LIPITOR) 20 MG tablet 90 tablet 1     Sig: Take 1 tablet by mouth daily       Date of last visit: 9/25/2023   Date of next visit: 3/25/2024  Date of last refill: 11/10/2023  90/1    Last Lipid Panel:    Lab Results   Component Value Date/Time    CHOL 124 09/25/2023 11:57 AM    TRIG 96 09/25/2023 11:57 AM    HDL 37 09/25/2023 11:57 AM    LDLCALC 68 09/25/2023 11:57 AM     Last CMP:   Lab Results   Component Value Date     09/25/2023    K 4.3 09/25/2023     09/25/2023    CO2 25 09/25/2023    BUN 17 09/25/2023    CREATININE 0.8 09/25/2023    GLUCOSE 95 09/25/2023    CALCIUM 9.1 09/25/2023    PROT 7.2 09/25/2023    LABALBU 4.4 09/25/2023    BILITOT 0.4 09/25/2023    ALKPHOS 116 09/25/2023    AST 18 09/25/2023    ALT 25 09/25/2023    LABGLOM >60 09/25/2023       Last Thyroid:    Lab Results   Component Value Date    TSH 2.110 09/25/2023    T4FREE 1.16 09/25/2023     Last Hemoglobin A1C:    Lab Results   Component Value Date/Time    LABA1C 5.4 09/25/2023 10:45 AM    LABA1C 5.8 11/25/2022 07:05 AM       Rx verified, ordered and set to EP.

## 2024-02-06 RX ORDER — ATORVASTATIN CALCIUM 20 MG/1
20 TABLET, FILM COATED ORAL DAILY
Qty: 90 TABLET | Refills: 1 | Status: SHIPPED | OUTPATIENT
Start: 2024-02-06

## 2024-02-20 DIAGNOSIS — E11.9 TYPE 2 DIABETES MELLITUS WITHOUT COMPLICATION, WITHOUT LONG-TERM CURRENT USE OF INSULIN (HCC): Primary | ICD-10-CM

## 2024-02-20 RX ORDER — ORAL SEMAGLUTIDE 14 MG/1
1 TABLET ORAL DAILY
Qty: 30 TABLET | Refills: 1 | Status: SHIPPED | OUTPATIENT
Start: 2024-02-20

## 2024-02-20 NOTE — TELEPHONE ENCOUNTER
Patient's last appointment was : 9/25/2023 with our   Patient's next appointment is : 3/6/2024 with our Dr. Corona  Last refilled on: 1/25/2024  Which pharmacy does the script need sent to: [facility]       Lab Results   Component Value Date    LABA1C 5.4 09/25/2023     Lab Results   Component Value Date    CHOL 124 09/25/2023    TRIG 96 09/25/2023    HDL 37 09/25/2023    LDLCALC 68 09/25/2023     Lab Results   Component Value Date     09/25/2023    K 4.3 09/25/2023     09/25/2023    CO2 25 09/25/2023    BUN 17 09/25/2023    CREATININE 0.8 09/25/2023    GLUCOSE 95 09/25/2023    CALCIUM 9.1 09/25/2023    PROT 7.2 09/25/2023    LABALBU 4.4 09/25/2023    BILITOT 0.4 09/25/2023    ALKPHOS 116 09/25/2023    AST 18 09/25/2023    ALT 25 09/25/2023    LABGLOM >60 09/25/2023     Lab Results   Component Value Date    TSH 2.110 09/25/2023    T4FREE 1.16 09/25/2023     Lab Results   Component Value Date    WBC 8.7 09/25/2023    HGB 14.7 09/25/2023    HCT 45.6 09/25/2023    MCV 94.4 (H) 09/25/2023     09/25/2023

## 2024-03-06 ENCOUNTER — OFFICE VISIT (OUTPATIENT)
Dept: FAMILY MEDICINE CLINIC | Age: 59
End: 2024-03-06
Payer: COMMERCIAL

## 2024-03-06 VITALS
HEART RATE: 71 BPM | WEIGHT: 283.8 LBS | RESPIRATION RATE: 16 BRPM | DIASTOLIC BLOOD PRESSURE: 80 MMHG | BODY MASS INDEX: 39.73 KG/M2 | HEIGHT: 71 IN | SYSTOLIC BLOOD PRESSURE: 122 MMHG | OXYGEN SATURATION: 97 % | TEMPERATURE: 98.2 F

## 2024-03-06 DIAGNOSIS — E11.9 TYPE 2 DIABETES MELLITUS WITHOUT COMPLICATION, WITHOUT LONG-TERM CURRENT USE OF INSULIN (HCC): Primary | ICD-10-CM

## 2024-03-06 DIAGNOSIS — Z87.891 PERSONAL HISTORY OF TOBACCO USE: ICD-10-CM

## 2024-03-06 LAB — HBA1C MFR BLD: 6.4 %

## 2024-03-06 PROCEDURE — 3017F COLORECTAL CA SCREEN DOC REV: CPT

## 2024-03-06 PROCEDURE — 3044F HG A1C LEVEL LT 7.0%: CPT

## 2024-03-06 PROCEDURE — G8427 DOCREV CUR MEDS BY ELIG CLIN: HCPCS

## 2024-03-06 PROCEDURE — G0296 VISIT TO DETERM LDCT ELIG: HCPCS

## 2024-03-06 PROCEDURE — G8417 CALC BMI ABV UP PARAM F/U: HCPCS

## 2024-03-06 PROCEDURE — 1036F TOBACCO NON-USER: CPT

## 2024-03-06 PROCEDURE — 83036 HEMOGLOBIN GLYCOSYLATED A1C: CPT

## 2024-03-06 PROCEDURE — 99213 OFFICE O/P EST LOW 20 MIN: CPT

## 2024-03-06 PROCEDURE — G8482 FLU IMMUNIZE ORDER/ADMIN: HCPCS

## 2024-03-06 PROCEDURE — 2022F DILAT RTA XM EVC RTNOPTHY: CPT

## 2024-03-06 RX ORDER — SEMAGLUTIDE 1.34 MG/ML
1 INJECTION, SOLUTION SUBCUTANEOUS WEEKLY
Qty: 4 ML | Refills: 0 | Status: SHIPPED | OUTPATIENT
Start: 2024-03-06 | End: 2024-04-05

## 2024-03-06 ASSESSMENT — PATIENT HEALTH QUESTIONNAIRE - PHQ9
4. FEELING TIRED OR HAVING LITTLE ENERGY: 1
6. FEELING BAD ABOUT YOURSELF - OR THAT YOU ARE A FAILURE OR HAVE LET YOURSELF OR YOUR FAMILY DOWN: 0
8. MOVING OR SPEAKING SO SLOWLY THAT OTHER PEOPLE COULD HAVE NOTICED. OR THE OPPOSITE, BEING SO FIGETY OR RESTLESS THAT YOU HAVE BEEN MOVING AROUND A LOT MORE THAN USUAL: 0
7. TROUBLE CONCENTRATING ON THINGS, SUCH AS READING THE NEWSPAPER OR WATCHING TELEVISION: 1
SUM OF ALL RESPONSES TO PHQ QUESTIONS 1-9: 4
9. THOUGHTS THAT YOU WOULD BE BETTER OFF DEAD, OR OF HURTING YOURSELF: 0
SUM OF ALL RESPONSES TO PHQ QUESTIONS 1-9: 4
SUM OF ALL RESPONSES TO PHQ QUESTIONS 1-9: 4
SUM OF ALL RESPONSES TO PHQ9 QUESTIONS 1 & 2: 0
2. FEELING DOWN, DEPRESSED OR HOPELESS: 0
SUM OF ALL RESPONSES TO PHQ QUESTIONS 1-9: 4
10. IF YOU CHECKED OFF ANY PROBLEMS, HOW DIFFICULT HAVE THESE PROBLEMS MADE IT FOR YOU TO DO YOUR WORK, TAKE CARE OF THINGS AT HOME, OR GET ALONG WITH OTHER PEOPLE: 0
5. POOR APPETITE OR OVEREATING: 2
3. TROUBLE FALLING OR STAYING ASLEEP: 0
1. LITTLE INTEREST OR PLEASURE IN DOING THINGS: 0

## 2024-03-06 NOTE — PROGRESS NOTES
Attending Physician Note    I reviewed the patient's medical history, the resident's findings on physical examination, the patient's diagnoses, and treatment plan as documented in the resident note.  I concur with the treatment plan as documented.  Any additional suggestions noted below.    GE modifier    Brief summary:   DM type 2, controlled - change back to semaglutide injections as he saw more benefit with weight than he has with the oral form.  Encourage lifestyle improvements.    Lung cancer screening - CT lungs ordered.  Quit smoking 13 years ago, so with current guidelines will only need 2 more yearly CT screenings.

## 2024-03-06 NOTE — PATIENT INSTRUCTIONS
follow-up, he or she will help you understand what to do next.  After a lung cancer screening, you can go back to your usual activities right away.  A lung cancer screening test can't tell if you have lung cancer. If your results are positive, your doctor can't tell whether an abnormal finding is a harmless nodule, cancer, or something else without doing more tests.  What can you do to help prevent lung cancer?  Some lung cancers can't be prevented. But if you smoke, quitting smoking is the best step you can take to prevent lung cancer. If you want to quit, your doctor can recommend medicines or other ways to help.  Follow-up care is a key part of your treatment and safety. Be sure to make and go to all appointments, and call your doctor if you are having problems. It's also a good idea to know your test results and keep a list of the medicines you take.  Where can you learn more?  Go to https://www.[employer].net/patientEd and enter Q940 to learn more about \"Learning About Lung Cancer Screening.\"  Current as of: February 28, 2023               Content Version: 13.9  © 6365-4945 [employer].   Care instructions adapted under license by [employer]. If you have questions about a medical condition or this instruction, always ask your healthcare professional. [employer] disclaims any warranty or liability for your use of this information.

## 2024-03-06 NOTE — PROGRESS NOTES
Patient:Danny Antonio  YOB: 1965  MRN: [id number]    Subjective   58 y.o. male who presents for the following: Diabetes (DM controlled by medication, discuss rybelsus doesn't seem to be helping with weight /A1C 5.4 at 9/25/2023)    HPI  Last seen on 9/25/23 for DM     DMT2: Chronic stable on metformin 1000 mg BID, and rebelsus 14 mg daily. Last POCT A1c 5.4  Concerned that weight isnt decreasing. Was previously on ozempic but was switched to rybelsus due to ozempic not being available.     HLD: Chronic stable on lipitor     Smoking - LDCT due, last completed in Nov 2022, was LUNGRADS 1, with recommendations of annual screenings.     Lab reviewed -    Review of Systems   Review of Systems   All other systems reviewed and are negative.    Patient History    Past Medical History:  He has a past medical history of Brain injury (HCC), Cerebellar stroke, acute (HCC), Diabetes mellitus (HCC), Major depression, Obesity (BMI 30-39.9), PFO with atrial septal aneurysm, Right sided weakness, Stroke (HCC), and Unspecified cerebral artery occlusion with cerebral infarction.    Social History:  He reports that he quit smoking about 13 years ago. His smoking use included cigarettes. He started smoking about 49 years ago. He has a 55.0 pack-year smoking history. He has never used smokeless tobacco. He reports that he does not drink alcohol and does not use drugs.     Past Surgical History:   Procedure Laterality Date    BRAIN SURGERY  3/13/14    [facility]     COLONOSCOPY  6/2011    COLONOSCOPY  10/21/2016    Dr. Wong    CYST REMOVAL Left 2007    leg    HERNIA REPAIR  2001    TONSILLECTOMY  2011    TRANSESOPHAGEAL ECHOCARDIOGRAM  2014      Family History   Problem Relation Age of Onset    High Blood Pressure Mother     Cancer Father 62        Lung cancer     Objective     Vitals:    03/06/24 1310   BP: 122/80   Site: Left Upper Arm   Position: Sitting   Cuff Size: Large Adult   Pulse: 71   Resp: 16   Temp: 98.2

## 2024-03-20 ENCOUNTER — HOSPITAL ENCOUNTER (OUTPATIENT)
Dept: CT IMAGING | Age: 59
Discharge: HOME | End: 2024-03-20
Payer: COMMERCIAL

## 2024-03-20 DIAGNOSIS — Z87.891 PERSONAL HISTORY OF TOBACCO USE: ICD-10-CM

## 2024-03-20 PROCEDURE — 71271 CT THORAX LUNG CANCER SCR C-: CPT

## 2024-04-08 ENCOUNTER — TELEMEDICINE (OUTPATIENT)
Dept: FAMILY MEDICINE CLINIC | Age: 59
End: 2024-04-08
Payer: COMMERCIAL

## 2024-04-08 DIAGNOSIS — E11.9 TYPE 2 DIABETES MELLITUS WITHOUT COMPLICATION, WITHOUT LONG-TERM CURRENT USE OF INSULIN (HCC): Primary | ICD-10-CM

## 2024-04-08 PROCEDURE — G8427 DOCREV CUR MEDS BY ELIG CLIN: HCPCS

## 2024-04-08 PROCEDURE — 2022F DILAT RTA XM EVC RTNOPTHY: CPT

## 2024-04-08 PROCEDURE — 3017F COLORECTAL CA SCREEN DOC REV: CPT

## 2024-04-08 PROCEDURE — 99213 OFFICE O/P EST LOW 20 MIN: CPT

## 2024-04-08 PROCEDURE — 3044F HG A1C LEVEL LT 7.0%: CPT

## 2024-04-08 RX ORDER — SEMAGLUTIDE 2.68 MG/ML
2 INJECTION, SOLUTION SUBCUTANEOUS WEEKLY
Qty: 3 ML | Refills: 0 | Status: SHIPPED | OUTPATIENT
Start: 2024-04-17 | End: 2024-05-17

## 2024-04-08 SDOH — ECONOMIC STABILITY: TRANSPORTATION INSECURITY
IN THE PAST 12 MONTHS, HAS LACK OF TRANSPORTATION KEPT YOU FROM MEETINGS, WORK, OR FROM GETTING THINGS NEEDED FOR DAILY LIVING?: NO

## 2024-04-08 SDOH — ECONOMIC STABILITY: INCOME INSECURITY: HOW HARD IS IT FOR YOU TO PAY FOR THE VERY BASICS LIKE FOOD, HOUSING, MEDICAL CARE, AND HEATING?: NOT HARD AT ALL

## 2024-04-08 SDOH — ECONOMIC STABILITY: FOOD INSECURITY: WITHIN THE PAST 12 MONTHS, THE FOOD YOU BOUGHT JUST DIDN'T LAST AND YOU DIDN'T HAVE MONEY TO GET MORE.: NEVER TRUE

## 2024-04-08 SDOH — ECONOMIC STABILITY: FOOD INSECURITY: WITHIN THE PAST 12 MONTHS, YOU WORRIED THAT YOUR FOOD WOULD RUN OUT BEFORE YOU GOT MONEY TO BUY MORE.: NEVER TRUE

## 2024-04-08 NOTE — PROGRESS NOTES
S: 58 y.o. male with   Chief Complaint   Patient presents with    Follow-up       HPI: please see resident note for HPI and ROS.    BP Readings from Last 3 Encounters:   03/06/24 122/80   09/25/23 118/70   03/27/23 102/70     Wt Readings from Last 3 Encounters:   03/06/24 128.7 kg (283 lb 12.8 oz)   09/25/23 117 kg (258 lb)   03/27/23 110.2 kg (243 lb)       O: VS:  vitals were not taken for this visit.   Physical exam performed via A/V telecommunication.     Diagnosis Orders   1. Type 2 diabetes mellitus without complication, without long-term current use of insulin (HCC)  semaglutide, 2 MG/DOSE, (OZEMPIC, 2 MG/DOSE,) 8 MG/3ML SOPN sc injection          Plan:  Please refer to resident note for full plan.    58-year-old male presents the office with a history of diabetes via virtual visit.  Patient was previously on Ozempic was doing well on 2 mg weekly was unable to get through insurance switched over to Rybelsus which did not work for patient.  Was then switched back to Ozempic and is currently in the 1 mg weekly dose.  States that is improving his symptoms and sugars improving but overall not where was previously.  Will plan to increase Ozempic up to the 2 mg weekly dose and follow-up in 6 weeks for repeat blood work.      Health Maintenance Due   Topic Date Due    DTaP/Tdap/Td vaccine (2 - Td or Tdap) 03/06/2023    Diabetic foot exam  06/20/2023    COVID-19 Vaccine (2 - 2023-24 season) 09/01/2023       Attending Physician Statement  I have discussed the case, including pertinent history and exam findings with the resident. I also have seen the patient and performed key portions of the examination.  I agree with the documented assessment and plan as documented by the resident.  RAISA Christianson Jr., DO 4/22/2024 8:00 AM

## 2024-04-08 NOTE — PROGRESS NOTES
Danny Antonio, was evaluated through a synchronous (real-time) audio-video encounter. The patient (or guardian if applicable) is aware that this is a billable service, which includes applicable co-pays. This Virtual Visit was conducted with patient's (and/or legal guardian's) consent. Patient identification was verified, and a caregiver was present when appropriate.   The patient was located at Home: [address] [place] [id number]  Provider was located at Facility (Appt Dept): [facility] [address]  [place] [address]  Confirm you are appropriately licensed, registered, or certified to deliver care in the state where the patient is located as indicated above. If you are not or unsure, please re-schedule the visit: Yes, I confirm.     Danny Antonio (:  1965) is a Established patient, presenting virtually for evaluation of follow up on diabetes medication    Assessment & Plan   Below is the assessment and plan developed based on review of pertinent history, physical exam, labs, studies, and medications.  1. Type 2 diabetes mellitus without complication, without long-term current use of insulin (HCC)  -     semaglutide, 2 MG/DOSE, (OZEMPIC, 2 MG/DOSE,) 8 MG/3ML SOPN sc injection; Inject 2 mg into the skin once a week, Disp-3 mL, R-0Normal  Chronic improving  Ozempic increased to 2 mg weekly  Complete foot exam at future visit   Return in about 5 weeks (around 2024) for Follow up (virtually) for DM medication .       Subjective   HPI  Last seen on 3/8/24  Last visit patient was switched back from oral rybelsus to ozempic that patient was previously on.  Otherwise patient is on metformin 1000 mg BID   Last A1c 6.4 on 3/8/24     Review of Systems   All other systems reviewed and are negative.       Objective   Patient-Reported Vitals  No data recorded     Physical Exam  [INSTRUCTIONS:  \"[x]\" Indicates a positive item  \"[]\" Indicates a negative item  -- DELETE ALL ITEMS NOT

## 2024-04-30 ENCOUNTER — PATIENT MESSAGE (OUTPATIENT)
Dept: FAMILY MEDICINE CLINIC | Age: 59
End: 2024-04-30

## 2024-04-30 DIAGNOSIS — R45.4 DIFFICULTY CONTROLLING ANGER: ICD-10-CM

## 2024-04-30 NOTE — TELEPHONE ENCOUNTER
From: Danny Antonio  To: Dr. Nilda Hunt  Sent: 4/30/2024 9:55 AM EDT  Subject: Sertraline    Dr. Hunt the pharmacy sent a request for the sertraline last week and no response. I take my last dose on Wednesday. Is there a reason why I can not get a refill? Please let me know. Karlo Antonio

## 2024-04-30 NOTE — TELEPHONE ENCOUNTER
Patient's last appointment was : 4/8/2024 with our   Patient's next appointment is : Visit date not found   Last refilled on: 4/24/2023 for 1 year  Which pharmacy does the script need sent to: [facility]      Lab Results   Component Value Date    LABA1C 6.4 03/06/2024     Lab Results   Component Value Date    CHOL 124 09/25/2023    TRIG 96 09/25/2023    HDL 37 09/25/2023    LDLCALC 68 09/25/2023     Lab Results   Component Value Date     09/25/2023    K 4.3 09/25/2023     09/25/2023    CO2 25 09/25/2023    BUN 17 09/25/2023    CREATININE 0.8 09/25/2023    GLUCOSE 95 09/25/2023    CALCIUM 9.1 09/25/2023    PROT 7.2 09/25/2023    BILITOT 0.4 09/25/2023    ALKPHOS 116 09/25/2023    AST 18 09/25/2023    ALT 25 09/25/2023    LABGLOM >60 09/25/2023     Lab Results   Component Value Date    TSH 2.110 09/25/2023    T4FREE 1.16 09/25/2023     Lab Results   Component Value Date    WBC 8.7 09/25/2023    HGB 14.7 09/25/2023    HCT 45.6 09/25/2023    MCV 94.4 (H) 09/25/2023     09/25/2023

## 2024-05-15 DIAGNOSIS — E11.9 TYPE 2 DIABETES MELLITUS WITHOUT COMPLICATION, WITHOUT LONG-TERM CURRENT USE OF INSULIN (HCC): ICD-10-CM

## 2024-05-15 RX ORDER — SEMAGLUTIDE 2.68 MG/ML
2 INJECTION, SOLUTION SUBCUTANEOUS WEEKLY
Qty: 3 ML | Refills: 0 | Status: SHIPPED | OUTPATIENT
Start: 2024-05-15 | End: 2024-06-14

## 2024-05-15 NOTE — TELEPHONE ENCOUNTER
Patient's last appointment was : 4/8/2024 with our   Patient's next appointment is : Visit date not found   Last refilled on: 04/17/2024  Which pharmacy does the script need sent to: [facility]      Lab Results   Component Value Date    LABA1C 6.4 03/06/2024     Lab Results   Component Value Date    CHOL 124 09/25/2023    TRIG 96 09/25/2023    HDL 37 09/25/2023     Lab Results   Component Value Date     09/25/2023    K 4.3 09/25/2023     09/25/2023    CO2 25 09/25/2023    BUN 17 09/25/2023    CREATININE 0.8 09/25/2023    GLUCOSE 95 09/25/2023    CALCIUM 9.1 09/25/2023    BILITOT 0.4 09/25/2023    ALKPHOS 116 09/25/2023    AST 18 09/25/2023    ALT 25 09/25/2023    LABGLOM >60 09/25/2023     Lab Results   Component Value Date    TSH 2.110 09/25/2023    T4FREE 1.16 09/25/2023     Lab Results   Component Value Date    WBC 8.7 09/25/2023    HGB 14.7 09/25/2023    HCT 45.6 09/25/2023    MCV 94.4 (H) 09/25/2023     09/25/2023

## 2024-06-11 DIAGNOSIS — E11.9 TYPE 2 DIABETES MELLITUS WITHOUT COMPLICATION, WITHOUT LONG-TERM CURRENT USE OF INSULIN (HCC): ICD-10-CM

## 2024-06-11 RX ORDER — SEMAGLUTIDE 2.68 MG/ML
2 INJECTION, SOLUTION SUBCUTANEOUS WEEKLY
Qty: 3 ML | Refills: 0 | Status: SHIPPED | OUTPATIENT
Start: 2024-06-11 | End: 2024-07-11

## 2024-06-11 NOTE — TELEPHONE ENCOUNTER
Patient's last appointment was : 4/8/2024 with our   Patient's next appointment is : Visit date not found   Last refilled on: 05/15/2024  Which [facility] the [facility] need [facility]      Lab Results   Component Value Date    LABA1C 6.4 03/06/2024     Lab Results   Component Value Date    CHOL 124 09/25/2023    TRIG 96 09/25/2023    HDL 37 09/25/2023     Lab Results   Component Value Date     09/25/2023    K 4.3 09/25/2023     09/25/2023    CO2 25 09/25/2023    BUN 17 09/25/2023    CREATININE 0.8 09/25/2023    GLUCOSE 95 09/25/2023    CALCIUM 9.1 09/25/2023    BILITOT 0.4 09/25/2023    ALKPHOS 116 09/25/2023    AST 18 09/25/2023    ALT 25 09/25/2023    LABGLOM >60 09/25/2023     Lab Results   Component Value Date    TSH 2.110 09/25/2023    T4FREE 1.16 09/25/2023     Lab Results   Component Value Date    WBC 8.7 09/25/2023    HGB 14.7 09/25/2023    HCT 45.6 09/25/2023    MCV 94.4 (H) 09/25/2023     09/25/2023

## 2024-07-01 DIAGNOSIS — E11.9 TYPE 2 DIABETES MELLITUS WITHOUT COMPLICATION, WITHOUT LONG-TERM CURRENT USE OF INSULIN (HCC): ICD-10-CM

## 2024-07-01 RX ORDER — SEMAGLUTIDE 2.68 MG/ML
2 INJECTION, SOLUTION SUBCUTANEOUS WEEKLY
Qty: 3 ML | Refills: 0 | Status: SHIPPED | OUTPATIENT
Start: 2024-07-01 | End: 2024-07-31

## 2024-07-01 NOTE — TELEPHONE ENCOUNTER
Patient's last appointment was : 4/8/2024 with our   Patient's next appointment is : Visit date not found with our  Nothing scheduled.   Last refilled on: 06/11  Which pharmacy does the script need sent to:   [id number] - [place], [place] - [address] -         Lab Results   Component Value Date    LABA1C 6.4 03/06/2024     Lab Results   Component Value Date    CHOL 124 09/25/2023    TRIG 96 09/25/2023    HDL 37 09/25/2023     Lab Results   Component Value Date     09/25/2023    K 4.3 09/25/2023     09/25/2023    CO2 25 09/25/2023    BUN 17 09/25/2023    CREATININE 0.8 09/25/2023    GLUCOSE 95 09/25/2023    CALCIUM 9.1 09/25/2023    BILITOT 0.4 09/25/2023    ALKPHOS 116 09/25/2023    AST 18 09/25/2023    ALT 25 09/25/2023    LABGLOM >60 09/25/2023     Lab Results   Component Value Date    TSH 2.110 09/25/2023    T4FREE 1.16 09/25/2023     Lab Results   Component Value Date    WBC 8.7 09/25/2023    HGB 14.7 09/25/2023    HCT 45.6 09/25/2023    MCV 94.4 (H) 09/25/2023     09/25/2023

## 2024-07-27 DIAGNOSIS — R45.4 DIFFICULTY CONTROLLING ANGER: ICD-10-CM

## 2024-07-29 DIAGNOSIS — R45.4 DIFFICULTY CONTROLLING ANGER: ICD-10-CM

## 2024-07-29 NOTE — TELEPHONE ENCOUNTER
Patient's last appointment was : 4/8/2024 with our   Patient's next appointment is : Visit date not found   Last refilled on: 4-  Which pharmacy does the script need sent to: [facility]      Lab Results   Component Value Date    LABA1C 6.4 03/06/2024     Lab Results   Component Value Date    CHOL 124 09/25/2023    TRIG 96 09/25/2023    HDL 37 09/25/2023     Lab Results   Component Value Date     09/25/2023    K 4.3 09/25/2023     09/25/2023    CO2 25 09/25/2023    BUN 17 09/25/2023    CREATININE 0.8 09/25/2023    GLUCOSE 95 09/25/2023    CALCIUM 9.1 09/25/2023    BILITOT 0.4 09/25/2023    ALKPHOS 116 09/25/2023    AST 18 09/25/2023    ALT 25 09/25/2023    LABGLOM >60 09/25/2023     Lab Results   Component Value Date    TSH 2.110 09/25/2023    T4FREE 1.16 09/25/2023     Lab Results   Component Value Date    WBC 8.7 09/25/2023    HGB 14.7 09/25/2023    HCT 45.6 09/25/2023    MCV 94.4 (H) 09/25/2023     09/25/2023

## 2024-08-03 DIAGNOSIS — Z86.73 HISTORY OF CVA (CEREBROVASCULAR ACCIDENT): ICD-10-CM

## 2024-08-05 DIAGNOSIS — E11.9 TYPE 2 DIABETES MELLITUS WITHOUT COMPLICATION, WITHOUT LONG-TERM CURRENT USE OF INSULIN (HCC): ICD-10-CM

## 2024-08-06 RX ORDER — ATORVASTATIN CALCIUM 20 MG/1
20 TABLET, FILM COATED ORAL DAILY
Qty: 90 TABLET | Refills: 1 | Status: SHIPPED | OUTPATIENT
Start: 2024-08-06

## 2024-08-06 NOTE — TELEPHONE ENCOUNTER
Patient's last appointment was : 4/8/2024 with our   Patient's next appointment is : Visit date not found with our    Last refilled on: 07/01/2024  Which pharmacy does the script need sent to: [facility]      Lab Results   Component Value Date    LABA1C 6.4 03/06/2024     Lab Results   Component Value Date    CHOL 124 09/25/2023    TRIG 96 09/25/2023    HDL 37 09/25/2023     Lab Results   Component Value Date     09/25/2023    K 4.3 09/25/2023     09/25/2023    CO2 25 09/25/2023    BUN 17 09/25/2023    CREATININE 0.8 09/25/2023    GLUCOSE 95 09/25/2023    CALCIUM 9.1 09/25/2023    BILITOT 0.4 09/25/2023    ALKPHOS 116 09/25/2023    AST 18 09/25/2023    ALT 25 09/25/2023    LABGLOM >60 09/25/2023     Lab Results   Component Value Date    TSH 2.110 09/25/2023    T4FREE 1.16 09/25/2023     Lab Results   Component Value Date    WBC 8.7 09/25/2023    HGB 14.7 09/25/2023    HCT 45.6 09/25/2023    MCV 94.4 (H) 09/25/2023     09/25/2023

## 2024-08-06 NOTE — TELEPHONE ENCOUNTER
Patient's last appointment was : 4/8/2024 with our   Patient's next appointment is : No Appointment found  Last refilled on: 5/5/2024  Which [facility] does the [facility] need sent [facility]      Lab Results   Component Value Date    LABA1C 6.4 03/06/2024     Lab Results   Component Value Date    CHOL 124 09/25/2023    TRIG 96 09/25/2023    HDL 37 09/25/2023     Lab Results   Component Value Date     09/25/2023    K 4.3 09/25/2023     09/25/2023    CO2 25 09/25/2023    BUN 17 09/25/2023    CREATININE 0.8 09/25/2023    GLUCOSE 95 09/25/2023    CALCIUM 9.1 09/25/2023    BILITOT 0.4 09/25/2023    ALKPHOS 116 09/25/2023    AST 18 09/25/2023    ALT 25 09/25/2023    LABGLOM >60 09/25/2023     Lab Results   Component Value Date    TSH 2.110 09/25/2023    T4FREE 1.16 09/25/2023     Lab Results   Component Value Date    WBC 8.7 09/25/2023    HGB 14.7 09/25/2023    HCT 45.6 09/25/2023    MCV 94.4 (H) 09/25/2023     09/25/2023

## 2024-08-07 RX ORDER — SEMAGLUTIDE 2.68 MG/ML
2 INJECTION, SOLUTION SUBCUTANEOUS WEEKLY
Qty: 3 ML | Refills: 1 | Status: SHIPPED | OUTPATIENT
Start: 2024-08-07 | End: 2024-10-02

## 2024-09-18 ENCOUNTER — TELEPHONE (OUTPATIENT)
Dept: FAMILY MEDICINE CLINIC | Age: 59
End: 2024-09-18

## 2024-09-30 DIAGNOSIS — E11.9 TYPE 2 DIABETES MELLITUS WITHOUT COMPLICATION, WITHOUT LONG-TERM CURRENT USE OF INSULIN (HCC): ICD-10-CM

## 2024-09-30 RX ORDER — SEMAGLUTIDE 2.68 MG/ML
2 INJECTION, SOLUTION SUBCUTANEOUS WEEKLY
Qty: 3 ML | Refills: 1 | Status: SHIPPED | OUTPATIENT
Start: 2024-09-30 | End: 2024-11-25

## 2024-09-30 NOTE — TELEPHONE ENCOUNTER
Patient's last appointment was: 4/8/2024  with our   Patient's next appointment is: 10/16/2024  with our Dr. Hunt  Which pharmacy does the script need sent to: [id number] - [place], [place] - [address]       Lab Results   Component Value Date    LABA1C 6.4 03/06/2024     Lab Results   Component Value Date    CHOL 124 09/25/2023    TRIG 96 09/25/2023    HDL 37 09/25/2023     Lab Results   Component Value Date     09/25/2023    K 4.3 09/25/2023     09/25/2023    CO2 25 09/25/2023    BUN 17 09/25/2023    CREATININE 0.8 09/25/2023    GLUCOSE 95 09/25/2023    CALCIUM 9.1 09/25/2023    BILITOT 0.4 09/25/2023    ALKPHOS 116 09/25/2023    AST 18 09/25/2023    ALT 25 09/25/2023    LABGLOM >60 09/25/2023     Lab Results   Component Value Date    TSH 2.110 09/25/2023    T4FREE 1.16 09/25/2023     Lab Results   Component Value Date    WBC 8.7 09/25/2023    HGB 14.7 09/25/2023    HCT 45.6 09/25/2023    MCV 94.4 (H) 09/25/2023     09/25/2023

## 2024-10-16 ENCOUNTER — LAB (OUTPATIENT)
Dept: LAB | Age: 59
End: 2024-10-16

## 2024-10-16 ENCOUNTER — OFFICE VISIT (OUTPATIENT)
Dept: FAMILY MEDICINE CLINIC | Age: 59
End: 2024-10-16

## 2024-10-16 VITALS
HEART RATE: 80 BPM | RESPIRATION RATE: 20 BRPM | WEIGHT: 280 LBS | DIASTOLIC BLOOD PRESSURE: 80 MMHG | OXYGEN SATURATION: 96 % | BODY MASS INDEX: 39.2 KG/M2 | SYSTOLIC BLOOD PRESSURE: 132 MMHG | HEIGHT: 71 IN | TEMPERATURE: 98.4 F

## 2024-10-16 DIAGNOSIS — E55.9 VITAMIN D DEFICIENCY: ICD-10-CM

## 2024-10-16 DIAGNOSIS — E11.9 TYPE 2 DIABETES MELLITUS WITHOUT COMPLICATION, WITHOUT LONG-TERM CURRENT USE OF INSULIN (HCC): ICD-10-CM

## 2024-10-16 DIAGNOSIS — E78.00 HYPERCHOLESTEROLEMIA: ICD-10-CM

## 2024-10-16 DIAGNOSIS — Z00.00 ENCOUNTER FOR WELL ADULT EXAM WITHOUT ABNORMAL FINDINGS: ICD-10-CM

## 2024-10-16 DIAGNOSIS — Z00.00 WELL ADULT EXAM: Primary | ICD-10-CM

## 2024-10-16 LAB
BACTERIA: ABNORMAL
BILIRUB UR QL STRIP: NEGATIVE
CASTS #/AREA URNS LPF: ABNORMAL /LPF
CASTS #/AREA URNS LPF: ABNORMAL /LPF
CHARACTER UR: CLEAR
CHARCOAL URNS QL MICRO: ABNORMAL
COLOR UR: ABNORMAL
CREAT UR-MCNC: 292.3 MG/DL
CRYSTALS URNS QL MICRO: ABNORMAL
DEPRECATED MEAN GLUCOSE BLD GHB EST-ACNC: 129 MG/DL (ref 70–126)
EPITHELIAL CELLS, UA: ABNORMAL /HPF
GLUCOSE UR QL STRIP.AUTO: NEGATIVE MG/DL
HBA1C MFR BLD HPLC: 6.3 % (ref 4.4–6.4)
HGB UR QL STRIP.AUTO: NEGATIVE
KETONES UR QL STRIP.AUTO: ABNORMAL
LEUKOCYTE ESTERASE UR QL STRIP.AUTO: NEGATIVE
MICROALBUMIN UR-MCNC: 6.75 MG/DL
MICROALBUMIN/CREAT RATIO PNL UR: 23 MG/G (ref 0–30)
NITRITE UR QL STRIP.AUTO: NEGATIVE
PH UR STRIP.AUTO: 5.5 [PH] (ref 5–9)
PROT UR STRIP.AUTO-MCNC: ABNORMAL MG/DL
RBC #/AREA URNS HPF: ABNORMAL /HPF
RENAL EPI CELLS #/AREA URNS HPF: ABNORMAL /[HPF]
SPECIFIC GRAVITY UA: 1.02 (ref 1–1.03)
UROBILINOGEN, URINE: 1 EU/DL (ref 0–1)
WBC #/AREA URNS HPF: ABNORMAL /HPF
YEAST LIKE FUNGI URNS QL MICRO: ABNORMAL

## 2024-10-16 NOTE — PATIENT INSTRUCTIONS
hands, brush your teeth twice a day, and wear a seat belt in the car.   Where can you learn more?  Go to https://www.[employer].net/patientEd and enter P072 to learn more about \"Well Visit, Ages 18 to 65: Care Instructions.\"  Current as of: August 6, 2023  Content Version: 14.2  © 2024 [employer].   Care instructions adapted under license by [employer]. If you have questions about a medical condition or this instruction, always ask your healthcare professional. [employer] disclaims any warranty or liability for your use of this information.

## 2024-10-16 NOTE — PROGRESS NOTES
Immunizations Administered       Name Date Dose Route    Influenza, FLUCELVAX, (age 6 mo+) IM, Trivalent PF, 0.5mL 10/16/2024 0.5 mL Intramuscular    Site: Deltoid- Left    Lot: [id number]    NDC: 26926-422-02    TDaP, ADACEL (age 10y-64y), BOOSTRIX (age 10y+), IM, 0.5mL 10/16/2024 0.5 mL Intramuscular    Site: Deltoid- Left    Lot: [id number]    NDC: 72858-693-59         Patient tolerated well  
Medical History:   Diagnosis Date    Brain injury 2015    Cerebellar stroke, acute (HCC) 3/10/2014    Feels tired    Diabetes mellitus (HCC)     Major depression 2014    Obesity (BMI 30-39.9) 2014    PFO with atrial septal aneurysm 2014    Repaired at [facility] in 2014    Right sided weakness     Stroke (HCC) 03/10/2014    Unspecified cerebral artery occlusion with cerebral infarction      Past Surgical History:   Procedure Laterality Date    BRAIN SURGERY  3/13/14    [facility]     COLONOSCOPY  2011    COLONOSCOPY  10/21/2016    Dr. Wong    CYST REMOVAL Left 2007    leg    HERNIA REPAIR      TONSILLECTOMY      TRANSESOPHAGEAL ECHOCARDIOGRAM       Family History   Problem Relation Age of Onset    High Blood Pressure Mother     Cancer Father 62        Lung cancer     Social History     Tobacco Use    Smoking status: Former     Current packs/day: 0.00     Average packs/day: 1.5 packs/day for 36.7 years (55.0 ttl pk-yrs)     Types: Cigarettes     Start date: 1974     Quit date: 3/1/2011     Years since quittin.6    Smokeless tobacco: Never   Substance Use Topics    Alcohol use: No    Drug use: No           Objective   Vital Signs  /80 (Site: Left Upper Arm, Position: Sitting, Cuff Size: Medium Adult)   Pulse 80   Temp 98.4 °F (36.9 °C) (Temporal)   Resp 20   Ht 1.803 m (5' 11\")   Wt 127 kg (280 lb)   SpO2 96%   BMI 39.05 kg/m²     Wt Readings from Last 3 Encounters:   10/16/24 127 kg (280 lb)   24 128.7 kg (283 lb 12.8 oz)   23 117 kg (258 lb)       Physical Exam   General-  Alert and oriented x 3, NAD  HEENT: NC, AT, PERRLA, EOMI, anicteric sclerae  Ears: Normal tympanic membranes bilaterally  Nose: patent, no lesions  Mouth: no lesions, moist mucosas  Neck - supple, no significant adenopathy  Chest - clear to auscultation, no wheezes, rales or rhonchi, symmetric air entry  Heart - normal rate, regular rhythm, normal S1, S2, no murmurs, rubs, clicks

## 2024-10-29 DIAGNOSIS — R45.4 DIFFICULTY CONTROLLING ANGER: ICD-10-CM

## 2024-10-29 NOTE — TELEPHONE ENCOUNTER
Patient's last appointment was: 10/16/2024  with our   Patient's next appointment is: 11/18/2024  with our Dr. Hunt  Last refilled on: 7/31/2024  Which pharmacy does the script need sent to: [facility]      Lab Results   Component Value Date    LABA1C 6.3 10/16/2024     Lab Results   Component Value Date    CHOL 124 09/25/2023    TRIG 96 09/25/2023    HDL 37 09/25/2023     Lab Results   Component Value Date     09/25/2023    K 4.3 09/25/2023     09/25/2023    CO2 25 09/25/2023    BUN 17 09/25/2023    CREATININE 0.8 09/25/2023    GLUCOSE 95 09/25/2023    CALCIUM 9.1 09/25/2023    BILITOT 0.4 09/25/2023    ALKPHOS 116 09/25/2023    AST 18 09/25/2023    ALT 25 09/25/2023    LABGLOM >60 09/25/2023     Lab Results   Component Value Date    TSH 2.110 09/25/2023    T4FREE 1.16 09/25/2023     Lab Results   Component Value Date    WBC 8.7 09/25/2023    HGB 14.7 09/25/2023    HCT 45.6 09/25/2023    MCV 94.4 (H) 09/25/2023     09/25/2023

## 2024-11-18 ENCOUNTER — OFFICE VISIT (OUTPATIENT)
Dept: FAMILY MEDICINE CLINIC | Age: 59
End: 2024-11-18
Payer: COMMERCIAL

## 2024-11-18 ENCOUNTER — LAB (OUTPATIENT)
Dept: LAB | Age: 59
End: 2024-11-18

## 2024-11-18 ENCOUNTER — TELEPHONE (OUTPATIENT)
Dept: FAMILY MEDICINE CLINIC | Age: 59
End: 2024-11-18

## 2024-11-18 VITALS
HEART RATE: 74 BPM | HEIGHT: 71 IN | DIASTOLIC BLOOD PRESSURE: 76 MMHG | TEMPERATURE: 97.7 F | RESPIRATION RATE: 21 BRPM | OXYGEN SATURATION: 95 % | SYSTOLIC BLOOD PRESSURE: 124 MMHG | WEIGHT: 279.8 LBS | BODY MASS INDEX: 39.17 KG/M2

## 2024-11-18 DIAGNOSIS — E55.9 VITAMIN D DEFICIENCY: ICD-10-CM

## 2024-11-18 DIAGNOSIS — R45.4 DIFFICULTY CONTROLLING ANGER: ICD-10-CM

## 2024-11-18 DIAGNOSIS — E11.9 TYPE 2 DIABETES MELLITUS WITHOUT COMPLICATION, WITHOUT LONG-TERM CURRENT USE OF INSULIN (HCC): Primary | ICD-10-CM

## 2024-11-18 DIAGNOSIS — E78.00 HYPERCHOLESTEROLEMIA: ICD-10-CM

## 2024-11-18 DIAGNOSIS — E11.9 TYPE 2 DIABETES MELLITUS WITHOUT COMPLICATION, WITHOUT LONG-TERM CURRENT USE OF INSULIN (HCC): ICD-10-CM

## 2024-11-18 DIAGNOSIS — Z86.73 HISTORY OF CVA (CEREBROVASCULAR ACCIDENT): ICD-10-CM

## 2024-11-18 LAB
25(OH)D3 SERPL-MCNC: 60 NG/ML (ref 30–100)
ALBUMIN SERPL BCG-MCNC: 4.5 G/DL (ref 3.5–5.1)
ALP SERPL-CCNC: 110 U/L (ref 38–126)
ALT SERPL W/O P-5'-P-CCNC: 43 U/L (ref 11–66)
ANION GAP SERPL CALC-SCNC: 11 MEQ/L (ref 8–16)
AST SERPL-CCNC: 20 U/L (ref 5–40)
BASOPHILS ABSOLUTE: 0 THOU/MM3 (ref 0–0.1)
BASOPHILS NFR BLD AUTO: 0.2 %
BILIRUB SERPL-MCNC: 0.3 MG/DL (ref 0.3–1.2)
BUN SERPL-MCNC: 13 MG/DL (ref 7–22)
CALCIUM SERPL-MCNC: 9.3 MG/DL (ref 8.5–10.5)
CHLORIDE SERPL-SCNC: 105 MEQ/L (ref 98–111)
CHOLEST SERPL-MCNC: 127 MG/DL (ref 100–199)
CO2 SERPL-SCNC: 26 MEQ/L (ref 23–33)
CREAT SERPL-MCNC: 0.9 MG/DL (ref 0.4–1.2)
DEPRECATED RDW RBC AUTO: 42.2 FL (ref 35–45)
EOSINOPHIL NFR BLD AUTO: 1.1 %
EOSINOPHILS ABSOLUTE: 0.1 THOU/MM3 (ref 0–0.4)
ERYTHROCYTE [DISTWIDTH] IN BLOOD BY AUTOMATED COUNT: 12.7 % (ref 11.5–14.5)
GFR SERPL CREATININE-BSD FRML MDRD: > 90 ML/MIN/1.73M2
GLUCOSE SERPL-MCNC: 110 MG/DL (ref 70–108)
HCT VFR BLD AUTO: 42.6 % (ref 42–52)
HDLC SERPL-MCNC: 35 MG/DL
HGB BLD-MCNC: 14 GM/DL (ref 14–18)
IMM GRANULOCYTES # BLD AUTO: 0.03 THOU/MM3 (ref 0–0.07)
IMM GRANULOCYTES NFR BLD AUTO: 0.4 %
LDLC SERPL CALC-MCNC: 67 MG/DL
LYMPHOCYTES ABSOLUTE: 2.1 THOU/MM3 (ref 1–4.8)
LYMPHOCYTES NFR BLD AUTO: 26.5 %
MAGNESIUM SERPL-MCNC: 2.1 MG/DL (ref 1.6–2.4)
MCH RBC QN AUTO: 30.2 PG (ref 26–33)
MCHC RBC AUTO-ENTMCNC: 32.9 GM/DL (ref 32.2–35.5)
MCV RBC AUTO: 92 FL (ref 80–94)
MONOCYTES ABSOLUTE: 0.6 THOU/MM3 (ref 0.4–1.3)
MONOCYTES NFR BLD AUTO: 7 %
NEUTROPHILS ABSOLUTE: 5.2 THOU/MM3 (ref 1.8–7.7)
NEUTROPHILS NFR BLD AUTO: 64.8 %
NRBC BLD AUTO-RTO: 0 /100 WBC
PLATELET # BLD AUTO: 276 THOU/MM3 (ref 130–400)
PMV BLD AUTO: 10.7 FL (ref 9.4–12.4)
POTASSIUM SERPL-SCNC: 4.8 MEQ/L (ref 3.5–5.2)
PROT SERPL-MCNC: 7.4 G/DL (ref 6.1–8)
RBC # BLD AUTO: 4.63 MILL/MM3 (ref 4.7–6.1)
SODIUM SERPL-SCNC: 142 MEQ/L (ref 135–145)
TRIGL SERPL-MCNC: 127 MG/DL (ref 0–199)
TSH SERPL DL<=0.005 MIU/L-ACNC: 2.01 UIU/ML (ref 0.4–4.2)
VIT B12 SERPL-MCNC: 573 PG/ML (ref 211–911)
WBC # BLD AUTO: 8.1 THOU/MM3 (ref 4.8–10.8)

## 2024-11-18 PROCEDURE — G8417 CALC BMI ABV UP PARAM F/U: HCPCS | Performed by: FAMILY MEDICINE

## 2024-11-18 PROCEDURE — 3044F HG A1C LEVEL LT 7.0%: CPT | Performed by: FAMILY MEDICINE

## 2024-11-18 PROCEDURE — 2022F DILAT RTA XM EVC RTNOPTHY: CPT | Performed by: FAMILY MEDICINE

## 2024-11-18 PROCEDURE — 1036F TOBACCO NON-USER: CPT | Performed by: FAMILY MEDICINE

## 2024-11-18 PROCEDURE — G8484 FLU IMMUNIZE NO ADMIN: HCPCS | Performed by: FAMILY MEDICINE

## 2024-11-18 PROCEDURE — G8427 DOCREV CUR MEDS BY ELIG CLIN: HCPCS | Performed by: FAMILY MEDICINE

## 2024-11-18 PROCEDURE — 99212 OFFICE O/P EST SF 10 MIN: CPT | Performed by: FAMILY MEDICINE

## 2024-11-18 PROCEDURE — 3017F COLORECTAL CA SCREEN DOC REV: CPT | Performed by: FAMILY MEDICINE

## 2024-11-18 RX ORDER — ATORVASTATIN CALCIUM 20 MG/1
20 TABLET, FILM COATED ORAL DAILY
Qty: 90 TABLET | Refills: 3 | Status: SHIPPED | OUTPATIENT
Start: 2024-11-18

## 2024-11-18 RX ORDER — SEMAGLUTIDE 2.68 MG/ML
2 INJECTION, SOLUTION SUBCUTANEOUS WEEKLY
Qty: 3 ML | Refills: 1 | Status: SHIPPED | OUTPATIENT
Start: 2024-11-18 | End: 2025-01-13

## 2024-11-18 NOTE — PROGRESS NOTES
[facility]  [address] [place], [place] [address]      Provider:  Nilda Hunt MD        Patient:  Danny Antonio  YOB: 1965      Visit Date:  2024                                              Reason For Visit:   Chief Complaint   Patient presents with    Follow-up     Patient has no concerns or complaints.         Danny is a 59 y.o. male who comes today to the office because of blood test.  He did some of the blood test ordered but not all of them.  His HbA1C was 6.3% on Ozempic 2 mg SQ weekly and Metformin 1 tablet once daily    Significant Past Medical History:      Past Medical History:   Diagnosis Date    Brain injury 2015    Cerebellar stroke, acute (HCC) 3/10/2014    Feels tired    Diabetes mellitus (HCC)     Major depression 2014    Obesity (BMI 30-39.9) 2014    PFO with atrial septal aneurysm 2014    Repaired at [facility] in 2014    Right sided weakness     Stroke (HCC) 03/10/2014    Unspecified cerebral artery occlusion with cerebral infarction          Past Surgical History:   Procedure Laterality Date    BRAIN SURGERY  3/13/14    [facility]     COLONOSCOPY  2011    COLONOSCOPY  10/21/2016    Dr. Wong    CYST REMOVAL Left 2007    leg    HERNIA REPAIR      TONSILLECTOMY      TRANSESOPHAGEAL ECHOCARDIOGRAM       Family History   Problem Relation Age of Onset    High Blood Pressure Mother     Cancer Father 62        Lung cancer     Social History     Socioeconomic History    Marital status:     Number of children: 0    Years of education: 12   Occupational History    Occupation: technician     Employer: [employer]   Tobacco Use    Smoking status: Former     Current packs/day: 0.00     Average packs/day: 1.5 packs/day for 36.7 years (55.0 ttl pk-yrs)     Types: Cigarettes     Start date: 1974     Quit date: 3/1/2011     Years since quittin.7    Smokeless tobacco: Never   Substance and

## 2024-11-18 NOTE — TELEPHONE ENCOUNTER
----- Message from Dr. Nilda Hunt MD sent at 11/18/2024  2:42 PM EST -----  Blood test results are within the normal ranges. Still couple of them are pending

## 2024-11-25 ENCOUNTER — TELEPHONE (OUTPATIENT)
Dept: FAMILY MEDICINE CLINIC | Age: 59
End: 2024-11-25

## 2024-11-25 NOTE — TELEPHONE ENCOUNTER
----- Message from Dr. Nilda uHnt MD sent at 11/22/2024  3:07 PM EST -----  Blood test within acceptable ranges   [Morbid Obesity (BMI>40)] : morbid obesity (bmi>40)

## 2025-01-27 DIAGNOSIS — R45.4 DIFFICULTY CONTROLLING ANGER: ICD-10-CM

## 2025-01-27 NOTE — TELEPHONE ENCOUNTER
Patient's last appointment was: 11/18/2024  with our   Patient's next appointment is: 6/30/2025  with our Dr. Hunt  Last refilled on: 11/18/2024  Which pharmacy does the script need sent to: [facility]      Lab Results   Component Value Date    LABA1C 6.3 10/16/2024     Lab Results   Component Value Date    CHOL 127 11/18/2024    TRIG 127 11/18/2024    HDL 35 11/18/2024     Lab Results   Component Value Date     11/18/2024    K 4.8 11/18/2024     11/18/2024    CO2 26 11/18/2024    BUN 13 11/18/2024    CREATININE 0.9 11/18/2024    GLUCOSE 110 (H) 11/18/2024    CALCIUM 9.3 11/18/2024    BILITOT 0.3 11/18/2024    ALKPHOS 110 11/18/2024    AST 20 11/18/2024    ALT 43 11/18/2024    LABGLOM > 90 11/18/2024     Lab Results   Component Value Date    TSH 2.010 11/18/2024    T4FREE 1.16 09/25/2023     Lab Results   Component Value Date    WBC 8.1 11/18/2024    HGB 14.0 11/18/2024    HCT 42.6 11/18/2024    MCV 92.0 11/18/2024     11/18/2024

## 2025-02-11 DIAGNOSIS — E11.9 TYPE 2 DIABETES MELLITUS WITHOUT COMPLICATION, WITHOUT LONG-TERM CURRENT USE OF INSULIN (HCC): ICD-10-CM

## 2025-02-11 RX ORDER — SEMAGLUTIDE 2.68 MG/ML
2 INJECTION, SOLUTION SUBCUTANEOUS WEEKLY
Qty: 3 ML | Refills: 1 | Status: SHIPPED | OUTPATIENT
Start: 2025-02-11 | End: 2025-04-08

## 2025-02-11 NOTE — TELEPHONE ENCOUNTER
Patient's last appointment was: 11/18/2024  with our   Patient's next appointment is: 6/30/2025  with our Dr. Hunt  Which pharmacy does the script need sent to: [id number] - [place], [place] -       Lab Results   Component Value Date    LABA1C 6.3 10/16/2024     Lab Results   Component Value Date    CHOL 127 11/18/2024    TRIG 127 11/18/2024    HDL 35 11/18/2024     Lab Results   Component Value Date     11/18/2024    K 4.8 11/18/2024     11/18/2024    CO2 26 11/18/2024    BUN 13 11/18/2024    CREATININE 0.9 11/18/2024    GLUCOSE 110 (H) 11/18/2024    CALCIUM 9.3 11/18/2024    BILITOT 0.3 11/18/2024    ALKPHOS 110 11/18/2024    AST 20 11/18/2024    ALT 43 11/18/2024    LABGLOM > 90 11/18/2024     Lab Results   Component Value Date    TSH 2.010 11/18/2024    T4FREE 1.16 09/25/2023     Lab Results   Component Value Date    WBC 8.1 11/18/2024    HGB 14.0 11/18/2024    HCT 42.6 11/18/2024    MCV 92.0 11/18/2024     11/18/2024

## 2025-07-03 ENCOUNTER — OFFICE VISIT (OUTPATIENT)
Dept: FAMILY MEDICINE CLINIC | Age: 60
End: 2025-07-03
Payer: COMMERCIAL

## 2025-07-03 ENCOUNTER — LAB (OUTPATIENT)
Dept: LAB | Age: 60
End: 2025-07-03

## 2025-07-03 VITALS
RESPIRATION RATE: 20 BRPM | BODY MASS INDEX: 39.48 KG/M2 | TEMPERATURE: 98.4 F | WEIGHT: 282 LBS | HEIGHT: 71 IN | OXYGEN SATURATION: 95 % | SYSTOLIC BLOOD PRESSURE: 124 MMHG | DIASTOLIC BLOOD PRESSURE: 84 MMHG | HEART RATE: 73 BPM

## 2025-07-03 DIAGNOSIS — Z86.73 HISTORY OF CVA (CEREBROVASCULAR ACCIDENT): ICD-10-CM

## 2025-07-03 DIAGNOSIS — E11.9 TYPE 2 DIABETES MELLITUS WITHOUT COMPLICATION, WITHOUT LONG-TERM CURRENT USE OF INSULIN (HCC): ICD-10-CM

## 2025-07-03 DIAGNOSIS — T30.4 CHEMICAL BURN: ICD-10-CM

## 2025-07-03 DIAGNOSIS — R45.4 DIFFICULTY CONTROLLING ANGER: ICD-10-CM

## 2025-07-03 DIAGNOSIS — E78.00 HYPERCHOLESTEROLEMIA: ICD-10-CM

## 2025-07-03 DIAGNOSIS — E11.9 TYPE 2 DIABETES MELLITUS WITHOUT COMPLICATION, WITHOUT LONG-TERM CURRENT USE OF INSULIN (HCC): Primary | ICD-10-CM

## 2025-07-03 DIAGNOSIS — E55.9 VITAMIN D DEFICIENCY: ICD-10-CM

## 2025-07-03 LAB
DEPRECATED MEAN GLUCOSE BLD GHB EST-ACNC: 282 MG/DL (ref 70–126)
HBA1C MFR BLD HPLC: 11.4 % (ref 4–6)

## 2025-07-03 PROCEDURE — 1036F TOBACCO NON-USER: CPT

## 2025-07-03 PROCEDURE — 3017F COLORECTAL CA SCREEN DOC REV: CPT

## 2025-07-03 PROCEDURE — G8417 CALC BMI ABV UP PARAM F/U: HCPCS

## 2025-07-03 PROCEDURE — 2022F DILAT RTA XM EVC RTNOPTHY: CPT

## 2025-07-03 PROCEDURE — 99214 OFFICE O/P EST MOD 30 MIN: CPT

## 2025-07-03 PROCEDURE — 3046F HEMOGLOBIN A1C LEVEL >9.0%: CPT

## 2025-07-03 PROCEDURE — G8427 DOCREV CUR MEDS BY ELIG CLIN: HCPCS

## 2025-07-03 RX ORDER — ATORVASTATIN CALCIUM 20 MG/1
20 TABLET, FILM COATED ORAL DAILY
Qty: 90 TABLET | Refills: 3 | Status: SHIPPED | OUTPATIENT
Start: 2025-07-03

## 2025-07-03 RX ORDER — SILVER SULFADIAZINE 10 MG/G
CREAM TOPICAL
Qty: 400 G | Refills: 0 | Status: SHIPPED | OUTPATIENT
Start: 2025-07-03

## 2025-07-03 SDOH — ECONOMIC STABILITY: FOOD INSECURITY: WITHIN THE PAST 12 MONTHS, YOU WORRIED THAT YOUR FOOD WOULD RUN OUT BEFORE YOU GOT MONEY TO BUY MORE.: PATIENT DECLINED

## 2025-07-03 SDOH — ECONOMIC STABILITY: FOOD INSECURITY: WITHIN THE PAST 12 MONTHS, THE FOOD YOU BOUGHT JUST DIDN'T LAST AND YOU DIDN'T HAVE MONEY TO GET MORE.: PATIENT DECLINED

## 2025-07-03 ASSESSMENT — PATIENT HEALTH QUESTIONNAIRE - PHQ9
5. POOR APPETITE OR OVEREATING: NOT AT ALL
10. IF YOU CHECKED OFF ANY PROBLEMS, HOW DIFFICULT HAVE THESE PROBLEMS MADE IT FOR YOU TO DO YOUR WORK, TAKE CARE OF THINGS AT HOME, OR GET ALONG WITH OTHER PEOPLE: NOT DIFFICULT AT ALL
SUM OF ALL RESPONSES TO PHQ QUESTIONS 1-9: 0
SUM OF ALL RESPONSES TO PHQ QUESTIONS 1-9: 0
2. FEELING DOWN, DEPRESSED OR HOPELESS: NOT AT ALL
3. TROUBLE FALLING OR STAYING ASLEEP: NOT AT ALL
1. LITTLE INTEREST OR PLEASURE IN DOING THINGS: NOT AT ALL
SUM OF ALL RESPONSES TO PHQ QUESTIONS 1-9: 0
4. FEELING TIRED OR HAVING LITTLE ENERGY: NOT AT ALL
7. TROUBLE CONCENTRATING ON THINGS, SUCH AS READING THE NEWSPAPER OR WATCHING TELEVISION: NOT AT ALL
SUM OF ALL RESPONSES TO PHQ QUESTIONS 1-9: 0
8. MOVING OR SPEAKING SO SLOWLY THAT OTHER PEOPLE COULD HAVE NOTICED. OR THE OPPOSITE, BEING SO FIGETY OR RESTLESS THAT YOU HAVE BEEN MOVING AROUND A LOT MORE THAN USUAL: NOT AT ALL
9. THOUGHTS THAT YOU WOULD BE BETTER OFF DEAD, OR OF HURTING YOURSELF: NOT AT ALL
6. FEELING BAD ABOUT YOURSELF - OR THAT YOU ARE A FAILURE OR HAVE LET YOURSELF OR YOUR FAMILY DOWN: NOT AT ALL
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
## 2025-07-04 ENCOUNTER — RESULTS FOLLOW-UP (OUTPATIENT)
Dept: FAMILY MEDICINE CLINIC | Age: 60
End: 2025-07-04

## 2025-07-07 NOTE — TELEPHONE ENCOUNTER
----- Message from Dr. Maura Agosto, DO sent at 7/4/2025 12:30 PM EDT -----  Hello, please schedule pt to be seen by me this Tuesday to discuss his A1C Thank you

## 2025-08-25 ENCOUNTER — OFFICE VISIT (OUTPATIENT)
Age: 60
End: 2025-08-25
Payer: COMMERCIAL

## 2025-08-25 VITALS
RESPIRATION RATE: 16 BRPM | HEART RATE: 88 BPM | BODY MASS INDEX: 38.42 KG/M2 | HEIGHT: 71 IN | DIASTOLIC BLOOD PRESSURE: 78 MMHG | OXYGEN SATURATION: 97 % | WEIGHT: 274.4 LBS | SYSTOLIC BLOOD PRESSURE: 134 MMHG

## 2025-08-25 DIAGNOSIS — Z12.5 PROSTATE CANCER SCREENING: ICD-10-CM

## 2025-08-25 DIAGNOSIS — R45.4 DIFFICULTY CONTROLLING ANGER: ICD-10-CM

## 2025-08-25 DIAGNOSIS — Z86.73 HISTORY OF CVA (CEREBROVASCULAR ACCIDENT): ICD-10-CM

## 2025-08-25 DIAGNOSIS — K42.9 UMBILICAL HERNIA WITHOUT OBSTRUCTION AND WITHOUT GANGRENE: ICD-10-CM

## 2025-08-25 DIAGNOSIS — Z86.39 HISTORY OF VITAMIN D DEFICIENCY: ICD-10-CM

## 2025-08-25 DIAGNOSIS — E78.00 HYPERCHOLESTEROLEMIA: ICD-10-CM

## 2025-08-25 DIAGNOSIS — E11.9 TYPE 2 DIABETES MELLITUS WITHOUT COMPLICATION, WITHOUT LONG-TERM CURRENT USE OF INSULIN (HCC): ICD-10-CM

## 2025-08-25 DIAGNOSIS — Z00.00 WELL ADULT EXAM: Primary | ICD-10-CM

## 2025-08-25 PROCEDURE — 99396 PREV VISIT EST AGE 40-64: CPT

## 2025-08-25 SDOH — HEALTH STABILITY: PHYSICAL HEALTH: ON AVERAGE, HOW MANY DAYS PER WEEK DO YOU ENGAGE IN MODERATE TO STRENUOUS EXERCISE (LIKE A BRISK WALK)?: 5 DAYS

## 2025-08-25 SDOH — HEALTH STABILITY: PHYSICAL HEALTH: ON AVERAGE, HOW MANY MINUTES DO YOU ENGAGE IN EXERCISE AT THIS LEVEL?: 150+ MIN

## 2025-08-25 ASSESSMENT — ENCOUNTER SYMPTOMS
SHORTNESS OF BREATH: 0
VOMITING: 0
NAUSEA: 0
DIARRHEA: 0
COUGH: 0
ABDOMINAL PAIN: 0
CONSTIPATION: 0